# Patient Record
Sex: MALE | Race: ASIAN | NOT HISPANIC OR LATINO | Employment: FULL TIME | URBAN - METROPOLITAN AREA
[De-identification: names, ages, dates, MRNs, and addresses within clinical notes are randomized per-mention and may not be internally consistent; named-entity substitution may affect disease eponyms.]

---

## 2017-01-21 ENCOUNTER — TRANSCRIBE ORDERS (OUTPATIENT)
Dept: ADMINISTRATIVE | Facility: HOSPITAL | Age: 52
End: 2017-01-21

## 2017-01-21 ENCOUNTER — APPOINTMENT (OUTPATIENT)
Dept: LAB | Facility: HOSPITAL | Age: 52
End: 2017-01-21
Attending: INTERNAL MEDICINE
Payer: COMMERCIAL

## 2017-01-21 DIAGNOSIS — E13.8 DIABETES MELLITUS OF OTHER TYPE WITH COMPLICATION: ICD-10-CM

## 2017-01-21 DIAGNOSIS — E13.8 DIABETES MELLITUS OF OTHER TYPE WITH COMPLICATION: Primary | ICD-10-CM

## 2017-01-21 LAB
ALBUMIN SERPL BCP-MCNC: 3.7 G/DL (ref 3.5–5)
ALP SERPL-CCNC: 58 U/L (ref 46–116)
ALT SERPL W P-5'-P-CCNC: 44 U/L (ref 12–78)
ANION GAP SERPL CALCULATED.3IONS-SCNC: 11 MMOL/L (ref 4–13)
AST SERPL W P-5'-P-CCNC: 18 U/L (ref 5–45)
BILIRUB SERPL-MCNC: 0.57 MG/DL (ref 0.2–1)
BUN SERPL-MCNC: 11 MG/DL (ref 5–25)
CALCIUM SERPL-MCNC: 9.1 MG/DL (ref 8.3–10.1)
CHLORIDE SERPL-SCNC: 105 MMOL/L (ref 100–108)
CHOLEST SERPL-MCNC: 152 MG/DL (ref 50–200)
CO2 SERPL-SCNC: 26 MMOL/L (ref 21–32)
CREAT SERPL-MCNC: 1.16 MG/DL (ref 0.6–1.3)
EST. AVERAGE GLUCOSE BLD GHB EST-MCNC: 197 MG/DL
GFR SERPL CREATININE-BSD FRML MDRD: >60 ML/MIN/1.73SQ M
GLUCOSE SERPL-MCNC: 174 MG/DL (ref 65–140)
HBA1C MFR BLD: 8.5 % (ref 4.2–6.3)
HDLC SERPL-MCNC: 36 MG/DL (ref 40–60)
LDLC SERPL CALC-MCNC: 68 MG/DL (ref 0–100)
POTASSIUM SERPL-SCNC: 3.8 MMOL/L (ref 3.5–5.3)
PROT SERPL-MCNC: 7.2 G/DL (ref 6.4–8.2)
SODIUM SERPL-SCNC: 142 MMOL/L (ref 136–145)
TRIGL SERPL-MCNC: 239 MG/DL

## 2017-01-21 PROCEDURE — 36415 COLL VENOUS BLD VENIPUNCTURE: CPT

## 2017-01-21 PROCEDURE — 80053 COMPREHEN METABOLIC PANEL: CPT

## 2017-01-21 PROCEDURE — 80061 LIPID PANEL: CPT

## 2017-01-21 PROCEDURE — 83036 HEMOGLOBIN GLYCOSYLATED A1C: CPT

## 2017-01-31 ENCOUNTER — GENERIC CONVERSION - ENCOUNTER (OUTPATIENT)
Dept: OTHER | Facility: OTHER | Age: 52
End: 2017-01-31

## 2017-01-31 ENCOUNTER — ALLSCRIPTS OFFICE VISIT (OUTPATIENT)
Dept: OTHER | Facility: OTHER | Age: 52
End: 2017-01-31

## 2017-02-01 PROCEDURE — 88304 TISSUE EXAM BY PATHOLOGIST: CPT | Performed by: SURGERY

## 2017-02-02 ENCOUNTER — LAB REQUISITION (OUTPATIENT)
Dept: LAB | Facility: HOSPITAL | Age: 52
End: 2017-02-02
Payer: COMMERCIAL

## 2017-02-02 DIAGNOSIS — L91.8 OTHER HYPERTROPHIC DISORDERS OF THE SKIN: ICD-10-CM

## 2017-08-12 ENCOUNTER — TRANSCRIBE ORDERS (OUTPATIENT)
Dept: ADMINISTRATIVE | Facility: HOSPITAL | Age: 52
End: 2017-08-12

## 2017-08-12 ENCOUNTER — APPOINTMENT (OUTPATIENT)
Dept: LAB | Facility: HOSPITAL | Age: 52
End: 2017-08-12
Payer: COMMERCIAL

## 2017-08-12 DIAGNOSIS — Z00.8 HEALTH EXAMINATION IN POPULATION SURVEY: ICD-10-CM

## 2017-08-12 DIAGNOSIS — Z00.8 HEALTH EXAMINATION IN POPULATION SURVEY: Primary | ICD-10-CM

## 2017-08-12 LAB
CHOLEST SERPL-MCNC: 183 MG/DL (ref 50–200)
EST. AVERAGE GLUCOSE BLD GHB EST-MCNC: 166 MG/DL
HBA1C MFR BLD: 7.4 % (ref 4.2–6.3)
HDLC SERPL-MCNC: 33 MG/DL (ref 40–60)
LDLC SERPL CALC-MCNC: 103 MG/DL (ref 0–100)
TRIGL SERPL-MCNC: 234 MG/DL

## 2017-08-12 PROCEDURE — 83036 HEMOGLOBIN GLYCOSYLATED A1C: CPT

## 2017-08-12 PROCEDURE — 80061 LIPID PANEL: CPT

## 2017-08-12 PROCEDURE — 36415 COLL VENOUS BLD VENIPUNCTURE: CPT

## 2018-01-13 VITALS
TEMPERATURE: 96.8 F | WEIGHT: 186 LBS | SYSTOLIC BLOOD PRESSURE: 130 MMHG | BODY MASS INDEX: 25.19 KG/M2 | RESPIRATION RATE: 16 BRPM | HEIGHT: 72 IN | DIASTOLIC BLOOD PRESSURE: 76 MMHG | HEART RATE: 64 BPM

## 2018-06-13 RX ORDER — ATORVASTATIN CALCIUM 20 MG/1
40 TABLET, FILM COATED ORAL
COMMUNITY
End: 2021-05-30

## 2018-06-15 ENCOUNTER — ANESTHESIA EVENT (OUTPATIENT)
Dept: GASTROENTEROLOGY | Facility: HOSPITAL | Age: 53
End: 2018-06-15
Payer: COMMERCIAL

## 2018-06-18 ENCOUNTER — ANESTHESIA (OUTPATIENT)
Dept: GASTROENTEROLOGY | Facility: HOSPITAL | Age: 53
End: 2018-06-18
Payer: COMMERCIAL

## 2018-06-18 ENCOUNTER — HOSPITAL ENCOUNTER (OUTPATIENT)
Facility: HOSPITAL | Age: 53
Setting detail: OUTPATIENT SURGERY
Discharge: HOME | End: 2018-06-18
Attending: INTERNAL MEDICINE | Admitting: INTERNAL MEDICINE
Payer: COMMERCIAL

## 2018-06-18 VITALS
WEIGHT: 175 LBS | HEIGHT: 72 IN | BODY MASS INDEX: 23.7 KG/M2 | HEART RATE: 72 BPM | OXYGEN SATURATION: 100 % | RESPIRATION RATE: 20 BRPM | SYSTOLIC BLOOD PRESSURE: 128 MMHG | DIASTOLIC BLOOD PRESSURE: 81 MMHG | TEMPERATURE: 97 F

## 2018-06-18 DIAGNOSIS — Z12.11 ENCOUNTER FOR SCREENING FOR MALIGNANT NEOPLASM OF COLON: ICD-10-CM

## 2018-06-18 PROCEDURE — 88305 TISSUE EXAM BY PATHOLOGIST: CPT | Performed by: PATHOLOGY

## 2018-06-18 RX ORDER — PROPOFOL 10 MG/ML
INJECTION, EMULSION INTRAVENOUS AS NEEDED
Status: DISCONTINUED | OUTPATIENT
Start: 2018-06-18 | End: 2018-06-18 | Stop reason: SURG

## 2018-06-18 RX ORDER — SODIUM CHLORIDE 9 MG/ML
125 INJECTION, SOLUTION INTRAVENOUS CONTINUOUS
Status: DISCONTINUED | OUTPATIENT
Start: 2018-06-18 | End: 2018-06-18 | Stop reason: HOSPADM

## 2018-06-18 RX ORDER — FOLIC ACID/MULTIVIT,IRON,MINER .4-18-35
1 TABLET,CHEWABLE ORAL DAILY
COMMUNITY

## 2018-06-18 RX ORDER — INSULIN GLARGINE 100 [IU]/ML
40 INJECTION, SOLUTION SUBCUTANEOUS 2 TIMES DAILY
COMMUNITY
End: 2020-12-15 | Stop reason: SDUPTHER

## 2018-06-18 RX ADMIN — PROPOFOL 30 MG: 10 INJECTION, EMULSION INTRAVENOUS at 10:55

## 2018-06-18 RX ADMIN — PROPOFOL 30 MG: 10 INJECTION, EMULSION INTRAVENOUS at 11:02

## 2018-06-18 RX ADMIN — SODIUM CHLORIDE: 0.9 INJECTION, SOLUTION INTRAVENOUS at 10:38

## 2018-06-18 RX ADMIN — PROPOFOL 40 MG: 10 INJECTION, EMULSION INTRAVENOUS at 10:45

## 2018-06-18 RX ADMIN — PROPOFOL 80 MG: 10 INJECTION, EMULSION INTRAVENOUS at 10:40

## 2018-06-18 RX ADMIN — PROPOFOL 70 MG: 10 INJECTION, EMULSION INTRAVENOUS at 10:50

## 2018-06-18 RX ADMIN — SODIUM CHLORIDE 125 ML/HR: 0.9 INJECTION, SOLUTION INTRAVENOUS at 09:40

## 2018-06-18 NOTE — OP NOTE
OPERATIVE REPORT  PATIENT NAME: Elizabeth Montoya    :  1965  MRN: [id number]  Pt Location: [facility] 01    SURGERY DATE: 2018    Surgeon(s) and Role:     * Nicolas Meeks MD - Primary    Preop Diagnosis:  Encounter for screening for malignant neoplasm of colon [Z12 11]    Post-Op Diagnosis Codes:     * Encounter for screening for malignant neoplasm of colon [Z12 11]    Procedure(s) (LRB):  COLONOSCOPY with polypectomy (N/A)    Specimen(s):  ID Type Source Tests Collected by Time Destination   1 : Rectal polyp Tissue Colon TISSUE EXAM Nicolas Meeks MD 2018 110        Estimated Blood Loss:   Minimal    Drains:       Anesthesia Type:   IV Sedation with Anesthesia    Operative Indications:  Encounter for screening for malignant neoplasm of colon [Z12 11]      Operative Findings:      Instrument: Olympus colonoscope  Medications: MAC per Anesthesia Service    COLONOSCOPY Procedure: Pre-procedure eval including cardiopulmonary exam was benign  The pt was felt to be stable for sedation and endoscopy  Consent was obtained  The pt was monitored with automatic BP, pOx and close clinical observation  COLONOSCOPY Findings: Inspection of anus and circumferential digital rectal examination were normal   The colonoscope was inserted and advanced to cecum as confirmed by regional landmarks: appendiceal orifice and ileocecal valve  The terminal ileum was intubated and appeared unremarkable  During withdrawal of the colonoscope, the colonic mucosa was carefully examined including the proximal aspects of the IC valve, the flexures and folds of the colon  Withdrawal time was 12 min  The colon prep was adequate with a score of 8/9 on the Ainsworth scale  There was severe diffuse diverticulosis throughout the colon  One diminutive rectal polyp was removed with cold Bx polypectomy  A retroflexed view in the rectum was unremarkable          The patient tolerated the procedure well without undue dicomfort, desaturation or hypotension  The patient recovered in the GI lab and was discharged to the company of family  Post-Endoscopic Dx:     1  Severe diffuse colon diverticulosis  2  Diminutive polyp removed from the  rectum      Recommendations:   1  Take Metamucil 2 capsules twice daily  2  Await pathology report  3    Recall colonoscopy in 5 to 10 depending on pathology results    Complications:       Procedure and Technique:         Patient Disposition:      SIGNATURE: Nicolas Meeks MD  DATE: June 18, 2018  TIME: 11:13 AM

## 2018-06-18 NOTE — DISCHARGE INSTRUCTIONS
Jovanna Espinoza  [id number]  [id number]  1965  6/18/2018    [facility]  POST-ENDOSCOPY INSTRUCTIONS    1)  Rest Today    2)  No alcoholic beverage or driving for 12 hours  3)  Return to your usual diet unless specifically insructed prior to leaving the facility  4) Call our office at [phone] if you have any problems or questions  5) Your examination showed:   A  Diffuse severe colon diverticulosis   B  One diminutive polyp was removed from rectum with cold biopsy polypectomy         6)  Medication: ___ Metamucil 2 capsules twice daily _________________________    _____________________________    7) Follow up at our GI office in:________________    8)  EGD due:  __________________    Colonoscopy due:  __ 5 to 10 years depending pathology results _____________              JASON Low M D Pasty Kub, M D Donnal Folds, MD    ____________________________________________________________                  Colorectal Polyps   WHAT YOU NEED TO KNOW:   Colorectal polyps are small growths of tissue in the lining of the colon and rectum  Most polyps are hyperplastic polyps and are usually benign (noncancerous)  Certain types of polyps, called adenomatous polyps, may turn into cancer  DISCHARGE INSTRUCTIONS:   Follow up with your healthcare provider or gastroenterologist as directed: You may need to return for more tests, such as another colonoscopy  Write down your questions so you remember to ask them during your visits  Reduce your risk for colorectal polyps:   · Eat a variety of healthy foods:  Healthy foods include fruit, vegetables, whole-grain breads, low-fat dairy products, beans, lean meat, and fish  Ask if you need to be on a special diet  · Maintain a healthy weight:  Ask your healthcare provider if you need to lose weight and how much you need to lose   Ask for help with a weight loss program     · Exercise: Begin to exercise slowly and do more as you get stronger  Talk with your healthcare provider before you start an exercise program      · Limit alcohol:  Your risk for polyps increases the more you drink  · Do not smoke: If you smoke, it is never too late to quit  Ask for information about how to stop  For support and more information:   · Nati Parra ([facility])  [address] , [place] [address]  Phone: [phone]  Web Address: www digestive  niddk nih gov  Contact your healthcare provider or gastroenterologist if:   · You have a fever  · You have chills, a cough, or feel weak and achy  · You have abdominal pain that does not go away or gets worse after you take medicine  · Your abdomen is swollen  · You are losing weight without trying  · You have questions or concerns about your condition or care  Seek care immediately or call 911 if:   · You have sudden shortness of breath  · You have a fast heart rate, fast breathing, or are too dizzy to stand up  · You have severe abdominal pain  · You see blood in your bowel movement  © 2017 [facility] [facility] Information is for End User's use only and may not be sold, redistributed or otherwise used for commercial purposes  All illustrations and images included in CareNotes® are the copyrighted property of A D A M , Inc  or Federico Prasad  The above information is an  only  It is not intended as medical advice for individual conditions or treatments  Talk to your doctor, nurse or pharmacist before following any medical regimen to see if it is safe and effective for you  Diverticulosis   WHAT YOU NEED TO KNOW:   Diverticulosis is a condition that causes small pockets called diverticula to form in your intestine  These pockets make it difficult for bowel movements to pass through your digestive system         DISCHARGE INSTRUCTIONS:   Seek care immediately if:   · You have severe pain on the left side of your lower abdomen  · Your bowel movements are bright or dark red  Contact your healthcare provider if:   · You have a fever and chills  · You feel dizzy or lightheaded  · You have nausea, or you are vomiting  · You have a change in your bowel movements  · You have questions or concerns about your condition or care  Medicines:   · Medicines  to soften your bowel movements may be given  You may also need medicines to treat symptoms such as bloating and pain  · Take your medicine as directed  Contact your healthcare provider if you think your medicine is not helping or if you have side effects  Tell him or her if you are allergic to any medicine  Keep a list of the medicines, vitamins, and herbs you take  Include the amounts, and when and why you take them  Bring the list or the pill bottles to follow-up visits  Carry your medicine list with you in case of an emergency  Self-care: The goal of treatment is to manage any symptoms you have and prevent other problems such as diverticulitis  Diverticulitis is swelling or infection of the diverticula  Your healthcare provider may recommend any of the following:  · Eat a variety of high-fiber foods  High-fiber foods help you have regular bowel movements  High-fiber foods include cooked beans, fruits, vegetables, and some cereals  Most adults need 25 to 35 grams of fiber each day  Your healthcare provider may recommend that you have more  Ask your healthcare provider how much fiber you need  Increase fiber slowly  You may have abdominal discomfort, bloating, and gas if you add fiber to your diet too quickly  You may need to take a fiber supplement if you are not getting enough fiber from food  · Drink liquids as directed  You may need to drink 2 to 3 liters (8 to 12 cups) of liquids every day   Ask your healthcare provider how much liquid to drink each day and which liquids are best for you  · Apply heat  on your abdomen for 20 to 30 minutes every 2 hours for as many days as directed  Heat helps decrease pain and muscle spasms  Help prevent diverticulitis or other symptoms: The following may help decrease your risk for diverticulitis or symptoms, such as bleeding  Talk to your provider about these or other things you can do to prevent problems that may occur with diverticulosis  · Exercise regularly  Ask your healthcare provider about the best exercise plan for you  Exercise can help you have regular bowel movements  Get 30 minutes of exercise on most days of the week  · Maintain a healthy weight  Ask your healthcare provider how much you should weigh  Ask him or her to help you create a weight loss plan if you are overweight  · Do not smoke  Nicotine and other chemicals in cigarettes increase your risk for diverticulitis  Ask your healthcare provider for information if you currently smoke and need help to quit  E-cigarettes or smokeless tobacco still contain nicotine  Talk to your healthcare provider before you use these products  · Ask your healthcare provider if it is safe to take NSAIDs  NSAIDs may increase your risk of diverticulitis  Follow up with your healthcare provider as directed:  Write down your questions so you remember to ask them during your visits  © 2017 [address] [facility] Information is for End User's use only and may not be sold, redistributed or otherwise used for commercial purposes  All illustrations and images included in CareNotes® are the copyrighted property of [employer] , [employer]  or Federico Prasad  The above information is an  only  It is not intended as medical advice for individual conditions or treatments  Talk to your doctor, nurse or pharmacist before following any medical regimen to see if it is safe and effective for you        Diverticulosis Diet   WHAT YOU NEED TO KNOW:   What is a diverticulosis diet?  A diverticulosis diet includes high-fiber foods  High-fiber foods help you have regular bowel movements  Extra fiber may decrease your risk of forming new diverticula (small pockets) in your intestine  A high-fiber diet may also help prevent diverticulitis  Diverticulitis is a painful condition that occurs when diverticula become inflamed or infected  You do not need to avoid nuts, seeds, corn, or popcorn while you are on a diverticulosis diet  How much fiber do I need? You may need 25 to 35 grams of fiber each day  Ask your dietitian or healthcare provider how much fiber you should have  Increase your intake of fiber slowly  When you eat more fiber, you may have gas and feel bloated  You may need to take a fiber supplement if you do not get enough fiber from food  Drink plenty of liquids as you increase the fiber in your diet  Your dietitian or healthcare provider may recommend 8 eight-ounce cups or more each day  Ask which liquids are best for you  Which foods are high in fiber? · Foods with at least 4 grams of fiber per serving:      ¨ ? to ½ cup of high-fiber cereal (check the nutrition label on the box)    ¨ ½ cup of blackberries or raspberries    ¨ 4 dried prunes    ¨ 1 cooked artichoke    ¨ ½ cup of cooked legumes, such as lentils, or red, kidney, and snyder beans    · Foods with 1 to 3 grams of fiber per serving:      ¨ 1 slice of whole-wheat, pumpernickel, or rye bread    ¨ 4 whole-wheat crackers    ¨ ½ cup of cereal with 1 to 3 grams of fiber per serving (check the nutrition label on the box)    ¨ 1 piece of fruit, such as an apple, banana, pear, kiwi, or orange    ¨ 3 dates    ¨ ½ cup of canned apricots, fruit cocktail, peaches, or pears    ¨ ½ cup of raw or cooked vegetables, such as carrots, cauliflower, cabbage, spinach, squash, or corn  When should I contact my healthcare provider? · You have questions about a high-fiber diet  · You have a change in your bowel movements      · You have an upset stomach  · You have a fever  · You have pain in your lower abdomen on the left side  · You have questions about your condition or care  CARE AGREEMENT:   You have the right to help plan your care  Learn about your health condition and how it may be treated  Discuss treatment options with your caregivers to decide what care you want to receive  You always have the right to refuse treatment  The above information is an  only  It is not intended as medical advice for individual conditions or treatments  Talk to your doctor, nurse or pharmacist before following any medical regimen to see if it is safe and effective for you  © 2017 [address] [employer]  Information is for End User's use only and may not be sold, redistributed or otherwise used for commercial purposes  All illustrations and images included in CareNotes® are the copyrighted property of [employer] , [employer]  or Federico Prasad  Colonoscopy   WHAT YOU NEED TO KNOW:   A colonoscopy is a procedure to examine the inside of your colon (intestine) with a scope  Polyps or tissue growths may have been removed during your colonoscopy  It is normal to feel bloated and to have some abdominal discomfort  You should be passing gas  If you have hemorrhoids or you had polyps removed, you may have a small amount of bleeding  DISCHARGE INSTRUCTIONS:   Seek care immediately if:   · You have a large amount of bright red blood in your bowel movements  · Your abdomen is hard and firm and you have severe pain  · You have sudden trouble breathing  Contact your healthcare provider if:   · You develop a rash or hives  · You have a fever within 24 hours of your procedure       · You have not had a bowel movement for 3 days after your procedure  · You have questions or concerns about your condition or care  Activity:   · Do not lift, strain, or run  for 3 days after your procedure  · Rest after your procedure  You have been given medicine to relax you  Do not  drive or make important decisions until the day after your procedure  Return to your normal activity as directed  · Relieve gas and discomfort from bloating  by lying on your right side with a heating pad on your abdomen  You may need to take short walks to help the gas move out  Eat small meals until bloating is relieved  If you had polyps removed: For 7 days after your procedure:  · Do not  take aspirin  · Do not  go on long car rides  Follow up with your healthcare provider as directed:  Write down your questions so you remember to ask them during your visits  © 2017 [address] [employer] is for End User's use only and may not be sold, redistributed or otherwise used for commercial purposes  All illustrations and images included in CareNotes® are the copyrighted property of A D A [employer]  or Federico Prasad  The above information is an  only  It is not intended as medical advice for individual conditions or treatments  Talk to your doctor, nurse or pharmacist before following any medical regimen to see if it is safe and effective for you

## 2018-06-18 NOTE — ANESTHESIA PREPROCEDURE EVALUATION
Review of Systems/Medical History  Patient summary reviewed  Chart reviewed  No history of anesthetic complications     Cardiovascular  Hyperlipidemia,    Pulmonary  Negative pulmonary ROS        GI/Hepatic    Bowel prep            Endo/Other  Diabetes well controlled type 2 Insulin,      GYN  Negative gynecology ROS          Hematology  Negative hematology ROS      Musculoskeletal  Negative musculoskeletal ROS        Neurology  Negative neurology ROS      Psychology   Negative psychology ROS              Physical Exam    Airway    Mallampati score: II  TM Distance: >3 FB  Neck ROM: full     Dental   No notable dental hx     Cardiovascular  Rhythm: regular, Rate: normal, Cardiovascular exam normal    Pulmonary  Pulmonary exam normal Breath sounds clear to auscultation,     Other Findings        Anesthesia Plan  ASA Score- 2     Anesthesia Type- Kerens block with ASA Monitors  Additional Monitors:   Airway Plan:         Plan Factors- Patient instructed to abstain from smoking on day of procedure  Patient did not smoke on day of surgery  Induction- intravenous  Postoperative Plan-     Informed Consent- Anesthetic plan and risks discussed with spouse and patient  I personally reviewed this patient with the CRNA  Discussed and agreed on the Anesthesia Plan with the CRNA  Bernice Hernadez

## 2018-06-18 NOTE — PROGRESS NOTES
D/C instructions given to pt and wife who verbalize understanding  OOB to ambulate, gait steady denies dizziness

## 2018-09-07 ENCOUNTER — APPOINTMENT (OUTPATIENT)
Dept: LAB | Facility: HOSPITAL | Age: 53
End: 2018-09-07
Payer: COMMERCIAL

## 2018-09-07 ENCOUNTER — TRANSCRIBE ORDERS (OUTPATIENT)
Dept: ADMINISTRATIVE | Facility: HOSPITAL | Age: 53
End: 2018-09-07

## 2018-09-07 DIAGNOSIS — Z00.8 HEALTH EXAMINATION IN POPULATION SURVEY: ICD-10-CM

## 2018-09-07 DIAGNOSIS — Z00.8 HEALTH EXAMINATION IN POPULATION SURVEY: Primary | ICD-10-CM

## 2018-09-07 LAB
CHOLEST SERPL-MCNC: 141 MG/DL (ref 50–200)
EST. AVERAGE GLUCOSE BLD GHB EST-MCNC: 151 MG/DL
HBA1C MFR BLD: 6.9 % (ref 4.2–6.3)
HDLC SERPL-MCNC: 29 MG/DL (ref 40–60)
LDLC SERPL CALC-MCNC: 73 MG/DL (ref 0–100)
NONHDLC SERPL-MCNC: 112 MG/DL
TRIGL SERPL-MCNC: 196 MG/DL

## 2018-09-07 PROCEDURE — 36415 COLL VENOUS BLD VENIPUNCTURE: CPT

## 2018-09-07 PROCEDURE — 83036 HEMOGLOBIN GLYCOSYLATED A1C: CPT

## 2018-09-07 PROCEDURE — 80061 LIPID PANEL: CPT

## 2019-09-18 ENCOUNTER — TRANSCRIBE ORDERS (OUTPATIENT)
Dept: LAB | Facility: CLINIC | Age: 54
End: 2019-09-18

## 2019-09-18 ENCOUNTER — APPOINTMENT (OUTPATIENT)
Dept: LAB | Facility: CLINIC | Age: 54
End: 2019-09-18
Payer: COMMERCIAL

## 2019-09-18 DIAGNOSIS — E78.5 HYPERLIPIDEMIA, UNSPECIFIED HYPERLIPIDEMIA TYPE: ICD-10-CM

## 2019-09-18 DIAGNOSIS — E11.65 TYPE II DIABETES MELLITUS WITH HYPEROSMOLARITY, UNCONTROLLED (HCC): ICD-10-CM

## 2019-09-18 DIAGNOSIS — E78.5 HYPERLIPIDEMIA, UNSPECIFIED HYPERLIPIDEMIA TYPE: Primary | ICD-10-CM

## 2019-09-18 DIAGNOSIS — E11.00 TYPE II DIABETES MELLITUS WITH HYPEROSMOLARITY, UNCONTROLLED (HCC): ICD-10-CM

## 2019-09-18 LAB
ALBUMIN SERPL BCP-MCNC: 3.8 G/DL (ref 3.5–5)
ALP SERPL-CCNC: 67 U/L (ref 46–116)
ALT SERPL W P-5'-P-CCNC: 51 U/L (ref 12–78)
ANION GAP SERPL CALCULATED.3IONS-SCNC: 7 MMOL/L (ref 4–13)
AST SERPL W P-5'-P-CCNC: 21 U/L (ref 5–45)
BILIRUB SERPL-MCNC: 0.5 MG/DL (ref 0.2–1)
BUN SERPL-MCNC: 13 MG/DL (ref 5–25)
CALCIUM SERPL-MCNC: 9.3 MG/DL (ref 8.3–10.1)
CHLORIDE SERPL-SCNC: 104 MMOL/L (ref 100–108)
CHOLEST SERPL-MCNC: 215 MG/DL (ref 50–200)
CO2 SERPL-SCNC: 30 MMOL/L (ref 21–32)
CREAT SERPL-MCNC: 1.2 MG/DL (ref 0.6–1.3)
CREAT UR-MCNC: 211 MG/DL
EST. AVERAGE GLUCOSE BLD GHB EST-MCNC: 169 MG/DL
GFR SERPL CREATININE-BSD FRML MDRD: 68 ML/MIN/1.73SQ M
GLUCOSE P FAST SERPL-MCNC: 157 MG/DL (ref 65–99)
HBA1C MFR BLD: 7.5 % (ref 4.2–6.3)
HDLC SERPL-MCNC: 31 MG/DL (ref 40–60)
LDLC SERPL CALC-MCNC: 109 MG/DL (ref 0–100)
MICROALBUMIN UR-MCNC: 47.2 MG/L (ref 0–20)
MICROALBUMIN/CREAT 24H UR: 22 MG/G CREATININE (ref 0–30)
NONHDLC SERPL-MCNC: 184 MG/DL
POTASSIUM SERPL-SCNC: 4 MMOL/L (ref 3.5–5.3)
PROT SERPL-MCNC: 7.5 G/DL (ref 6.4–8.2)
SODIUM SERPL-SCNC: 141 MMOL/L (ref 136–145)
TRIGL SERPL-MCNC: 375 MG/DL

## 2019-09-18 PROCEDURE — 82043 UR ALBUMIN QUANTITATIVE: CPT | Performed by: INTERNAL MEDICINE

## 2019-09-18 PROCEDURE — 83036 HEMOGLOBIN GLYCOSYLATED A1C: CPT | Performed by: INTERNAL MEDICINE

## 2019-09-18 PROCEDURE — 82570 ASSAY OF URINE CREATININE: CPT | Performed by: INTERNAL MEDICINE

## 2019-09-18 PROCEDURE — 36415 COLL VENOUS BLD VENIPUNCTURE: CPT | Performed by: INTERNAL MEDICINE

## 2019-09-18 PROCEDURE — 80053 COMPREHEN METABOLIC PANEL: CPT

## 2019-09-18 PROCEDURE — 80061 LIPID PANEL: CPT

## 2020-09-08 ENCOUNTER — TELEPHONE (OUTPATIENT)
Dept: INTERNAL MEDICINE CLINIC | Facility: CLINIC | Age: 55
End: 2020-09-08

## 2020-09-08 DIAGNOSIS — E11.65 UNCONTROLLED TYPE 2 DIABETES MELLITUS WITH HYPERGLYCEMIA (HCC): Primary | ICD-10-CM

## 2020-09-08 NOTE — TELEPHONE ENCOUNTER
Pt will need a script for bloodwork to go before his next appt with you end of sept  He wants a1c, lipid panel, tsh, psa, bmp and urine for protein/creatine ratio   He would like us to mail to him

## 2020-09-18 ENCOUNTER — APPOINTMENT (OUTPATIENT)
Dept: LAB | Facility: CLINIC | Age: 55
End: 2020-09-18
Payer: COMMERCIAL

## 2020-09-18 DIAGNOSIS — E11.65 UNCONTROLLED TYPE 2 DIABETES MELLITUS WITH HYPERGLYCEMIA (HCC): ICD-10-CM

## 2020-09-18 LAB
ALBUMIN SERPL BCP-MCNC: 3.8 G/DL (ref 3.5–5)
ALP SERPL-CCNC: 67 U/L (ref 46–116)
ALT SERPL W P-5'-P-CCNC: 30 U/L (ref 12–78)
ANION GAP SERPL CALCULATED.3IONS-SCNC: 11 MMOL/L (ref 4–13)
AST SERPL W P-5'-P-CCNC: 20 U/L (ref 5–45)
BASOPHILS # BLD AUTO: 0.03 THOUSANDS/ΜL (ref 0–0.1)
BASOPHILS NFR BLD AUTO: 0 % (ref 0–1)
BILIRUB SERPL-MCNC: 0.66 MG/DL (ref 0.2–1)
BUN SERPL-MCNC: 16 MG/DL (ref 5–25)
CALCIUM SERPL-MCNC: 9.5 MG/DL (ref 8.3–10.1)
CHLORIDE SERPL-SCNC: 107 MMOL/L (ref 100–108)
CHOLEST SERPL-MCNC: 126 MG/DL (ref 50–200)
CO2 SERPL-SCNC: 27 MMOL/L (ref 21–32)
CREAT SERPL-MCNC: 1.25 MG/DL (ref 0.6–1.3)
CREAT UR-MCNC: 168 MG/DL
EOSINOPHIL # BLD AUTO: 0.42 THOUSAND/ΜL (ref 0–0.61)
EOSINOPHIL NFR BLD AUTO: 5 % (ref 0–6)
ERYTHROCYTE [DISTWIDTH] IN BLOOD BY AUTOMATED COUNT: 12.3 % (ref 11.6–15.1)
EST. AVERAGE GLUCOSE BLD GHB EST-MCNC: 157 MG/DL
GFR SERPL CREATININE-BSD FRML MDRD: 64 ML/MIN/1.73SQ M
GLUCOSE P FAST SERPL-MCNC: 113 MG/DL (ref 65–99)
HBA1C MFR BLD: 7.1 %
HCT VFR BLD AUTO: 51.8 % (ref 36.5–49.3)
HDLC SERPL-MCNC: 33 MG/DL
HGB BLD-MCNC: 16.3 G/DL (ref 12–17)
IMM GRANULOCYTES # BLD AUTO: 0.03 THOUSAND/UL (ref 0–0.2)
IMM GRANULOCYTES NFR BLD AUTO: 0 % (ref 0–2)
LDLC SERPL CALC-MCNC: 72 MG/DL (ref 0–100)
LYMPHOCYTES # BLD AUTO: 2.57 THOUSANDS/ΜL (ref 0.6–4.47)
LYMPHOCYTES NFR BLD AUTO: 33 % (ref 14–44)
MCH RBC QN AUTO: 28.1 PG (ref 26.8–34.3)
MCHC RBC AUTO-ENTMCNC: 31.5 G/DL (ref 31.4–37.4)
MCV RBC AUTO: 89 FL (ref 82–98)
MONOCYTES # BLD AUTO: 0.49 THOUSAND/ΜL (ref 0.17–1.22)
MONOCYTES NFR BLD AUTO: 6 % (ref 4–12)
NEUTROPHILS # BLD AUTO: 4.19 THOUSANDS/ΜL (ref 1.85–7.62)
NEUTS SEG NFR BLD AUTO: 56 % (ref 43–75)
NONHDLC SERPL-MCNC: 93 MG/DL
NRBC BLD AUTO-RTO: 0 /100 WBCS
PLATELET # BLD AUTO: 265 THOUSANDS/UL (ref 149–390)
PMV BLD AUTO: 9.6 FL (ref 8.9–12.7)
POTASSIUM SERPL-SCNC: 4.9 MMOL/L (ref 3.5–5.3)
PROT SERPL-MCNC: 7.2 G/DL (ref 6.4–8.2)
PROT UR-MCNC: 21 MG/DL
PROT/CREAT UR: 0.13 MG/G{CREAT} (ref 0–0.1)
RBC # BLD AUTO: 5.81 MILLION/UL (ref 3.88–5.62)
SODIUM SERPL-SCNC: 145 MMOL/L (ref 136–145)
TRIGL SERPL-MCNC: 106 MG/DL
WBC # BLD AUTO: 7.73 THOUSAND/UL (ref 4.31–10.16)

## 2020-09-18 PROCEDURE — 84156 ASSAY OF PROTEIN URINE: CPT

## 2020-09-18 PROCEDURE — 85025 COMPLETE CBC W/AUTO DIFF WBC: CPT

## 2020-09-18 PROCEDURE — 82570 ASSAY OF URINE CREATININE: CPT

## 2020-09-18 PROCEDURE — 36415 COLL VENOUS BLD VENIPUNCTURE: CPT

## 2020-09-18 PROCEDURE — 80061 LIPID PANEL: CPT

## 2020-09-18 PROCEDURE — 83036 HEMOGLOBIN GLYCOSYLATED A1C: CPT

## 2020-09-18 PROCEDURE — 80053 COMPREHEN METABOLIC PANEL: CPT

## 2020-09-28 ENCOUNTER — OFFICE VISIT (OUTPATIENT)
Dept: INTERNAL MEDICINE CLINIC | Facility: CLINIC | Age: 55
End: 2020-09-28
Payer: COMMERCIAL

## 2020-09-28 VITALS
WEIGHT: 191 LBS | TEMPERATURE: 97.5 F | BODY MASS INDEX: 25.87 KG/M2 | HEART RATE: 99 BPM | HEIGHT: 72 IN | DIASTOLIC BLOOD PRESSURE: 82 MMHG | SYSTOLIC BLOOD PRESSURE: 134 MMHG

## 2020-09-28 DIAGNOSIS — N42.89 PROSTATE ATROPHY: ICD-10-CM

## 2020-09-28 DIAGNOSIS — Z79.4 TYPE 2 DIABETES MELLITUS WITHOUT COMPLICATION, WITH LONG-TERM CURRENT USE OF INSULIN (HCC): Primary | ICD-10-CM

## 2020-09-28 DIAGNOSIS — I10 ESSENTIAL HYPERTENSION: ICD-10-CM

## 2020-09-28 DIAGNOSIS — E11.9 TYPE 2 DIABETES MELLITUS WITHOUT COMPLICATION, WITH LONG-TERM CURRENT USE OF INSULIN (HCC): Primary | ICD-10-CM

## 2020-09-28 PROCEDURE — 99214 OFFICE O/P EST MOD 30 MIN: CPT | Performed by: INTERNAL MEDICINE

## 2020-09-28 RX ORDER — LOSARTAN POTASSIUM 25 MG/1
25 TABLET ORAL DAILY
Qty: 90 TABLET | Refills: 4 | Status: SHIPPED | OUTPATIENT
Start: 2020-09-28 | End: 2021-09-20 | Stop reason: SDUPTHER

## 2020-09-28 RX ORDER — INSULIN ASPART 100 [IU]/ML
20 INJECTION, SOLUTION INTRAVENOUS; SUBCUTANEOUS
Qty: 18 PEN | Refills: 4 | Status: SHIPPED | OUTPATIENT
Start: 2020-09-28 | End: 2020-12-15 | Stop reason: SDUPTHER

## 2020-09-28 NOTE — PROGRESS NOTES
Assessment/Plan:    No problem-specific Assessment & Plan notes found for this encounter  Diagnoses and all orders for this visit:    Prostate atrophy  -     PSA, Total Screen; Future    Type 2 diabetes mellitus without complication, with long-term current use of insulin (HCC)  -     insulin lispro (HumaLOG) 100 units/mL subcutaneous injection 20 Units  -     Hemoglobin A1C; Future  -     Lipid panel; Future  -     Comprehensive metabolic panel; Future    Essential hypertension  -     losartan (COZAAR) 25 mg tablet; Take 1 tablet (25 mg total) by mouth daily          Subjective:      Patient ID: Niall Pagan is a 62 y o  male  Follow up  Diabetes type  2, Hypertension, Hyperlipidemia      The following portions of the patient's history were reviewed and updated as appropriate: allergies, current medications, past family history, past medical history, past social history, past surgical history, and problem list     Review of Systems   Constitutional: Negative  HENT: Negative for dental problem, drooling, ear discharge and ear pain  Eyes: Negative for discharge, redness and itching  Respiratory: Negative for apnea, cough and wheezing  Cardiovascular: Negative for chest pain and palpitations  Gastrointestinal: Negative for abdominal pain, blood in stool, diarrhea and vomiting  Endocrine: Negative for polydipsia, polyphagia and polyuria  Genitourinary: Negative for decreased urine volume, dysuria and frequency  Musculoskeletal: Negative for arthralgias, myalgias and neck stiffness  Skin: Negative for pallor and wound  Allergic/Immunologic: Negative for environmental allergies and food allergies  Neurological: Negative for facial asymmetry, light-headedness, numbness and headaches  Hematological: Negative for adenopathy  Does not bruise/bleed easily  Psychiatric/Behavioral: Negative for agitation, behavioral problems and confusion           Objective:      /82 (BP Location: Left arm, Patient Position: Sitting, Cuff Size: Adult)   Pulse 99   Temp 97 5 °F (36 4 °C) (Temporal)   Ht 6' (1 829 m)   Wt 86 6 kg (191 lb)   BMI 25 90 kg/m²          Physical Exam  Constitutional:       Appearance: Normal appearance  HENT:      Head: Normocephalic  Nose: Nose normal       Mouth/Throat:      Mouth: Mucous membranes are moist    Eyes:      Pupils: Pupils are equal, round, and reactive to light  Neck:      Musculoskeletal: Neck supple  Cardiovascular:      Rate and Rhythm: Regular rhythm  Heart sounds: Normal heart sounds  Pulmonary:      Breath sounds: Normal breath sounds  Abdominal:      Palpations: Abdomen is soft  Musculoskeletal:         General: No swelling  Skin:     General: Skin is warm  Neurological:      General: No focal deficit present  Mental Status: He is alert and oriented to person, place, and time     Psychiatric:         Mood and Affect: Mood normal

## 2020-11-25 ENCOUNTER — LAB (OUTPATIENT)
Dept: LAB | Facility: CLINIC | Age: 55
End: 2020-11-25
Payer: COMMERCIAL

## 2020-11-25 DIAGNOSIS — N42.89 PROSTATE ATROPHY: ICD-10-CM

## 2020-11-25 LAB — PSA SERPL-MCNC: 1.2 NG/ML (ref 0–4)

## 2020-11-25 PROCEDURE — G0103 PSA SCREENING: HCPCS

## 2020-11-25 PROCEDURE — 36415 COLL VENOUS BLD VENIPUNCTURE: CPT

## 2020-12-15 DIAGNOSIS — E11.9 TYPE 2 DIABETES MELLITUS WITHOUT COMPLICATION, WITH LONG-TERM CURRENT USE OF INSULIN (HCC): Primary | ICD-10-CM

## 2020-12-15 DIAGNOSIS — Z79.4 TYPE 2 DIABETES MELLITUS WITHOUT COMPLICATION, WITH LONG-TERM CURRENT USE OF INSULIN (HCC): Primary | ICD-10-CM

## 2020-12-15 RX ORDER — INSULIN GLARGINE 100 [IU]/ML
40 INJECTION, SOLUTION SUBCUTANEOUS 2 TIMES DAILY
Qty: 2400 UNITS | Refills: 0 | Status: SHIPPED | OUTPATIENT
Start: 2020-12-15 | End: 2021-03-02 | Stop reason: SDUPTHER

## 2020-12-15 RX ORDER — INSULIN ASPART 100 [IU]/ML
20 INJECTION, SOLUTION INTRAVENOUS; SUBCUTANEOUS
Qty: 18 PEN | Refills: 4 | Status: SHIPPED | OUTPATIENT
Start: 2020-12-15 | End: 2021-09-20 | Stop reason: SDUPTHER

## 2020-12-18 ENCOUNTER — IMMUNIZATIONS (OUTPATIENT)
Dept: FAMILY MEDICINE CLINIC | Facility: HOSPITAL | Age: 55
End: 2020-12-18
Payer: COMMERCIAL

## 2020-12-18 DIAGNOSIS — Z23 ENCOUNTER FOR IMMUNIZATION: ICD-10-CM

## 2020-12-18 PROCEDURE — 91300 SARS-COV-2 / COVID-19 MRNA VACCINE (PFIZER-BIONTECH) 30 MCG: CPT

## 2020-12-18 PROCEDURE — 0001A SARS-COV-2 / COVID-19 MRNA VACCINE (PFIZER-BIONTECH) 30 MCG: CPT

## 2021-01-08 ENCOUNTER — IMMUNIZATIONS (OUTPATIENT)
Dept: FAMILY MEDICINE CLINIC | Facility: HOSPITAL | Age: 56
End: 2021-01-08

## 2021-01-08 DIAGNOSIS — Z23 ENCOUNTER FOR IMMUNIZATION: ICD-10-CM

## 2021-01-08 PROCEDURE — 91300 SARS-COV-2 / COVID-19 MRNA VACCINE (PFIZER-BIONTECH) 30 MCG: CPT

## 2021-01-08 PROCEDURE — 0002A SARS-COV-2 / COVID-19 MRNA VACCINE (PFIZER-BIONTECH) 30 MCG: CPT

## 2021-03-02 DIAGNOSIS — E11.9 TYPE 2 DIABETES MELLITUS WITHOUT COMPLICATION, WITH LONG-TERM CURRENT USE OF INSULIN (HCC): ICD-10-CM

## 2021-03-02 DIAGNOSIS — Z79.4 TYPE 2 DIABETES MELLITUS WITHOUT COMPLICATION, WITH LONG-TERM CURRENT USE OF INSULIN (HCC): ICD-10-CM

## 2021-03-02 RX ORDER — INSULIN GLARGINE 100 [IU]/ML
20 INJECTION, SOLUTION SUBCUTANEOUS 2 TIMES DAILY
Qty: 1200 UNITS | Refills: 0 | Status: SHIPPED | OUTPATIENT
Start: 2021-03-02 | End: 2021-06-01 | Stop reason: SDUPTHER

## 2021-05-28 DIAGNOSIS — E11.9 TYPE 2 DIABETES MELLITUS WITHOUT COMPLICATION, WITH LONG-TERM CURRENT USE OF INSULIN (HCC): Primary | ICD-10-CM

## 2021-05-28 DIAGNOSIS — Z79.4 TYPE 2 DIABETES MELLITUS WITHOUT COMPLICATION, WITH LONG-TERM CURRENT USE OF INSULIN (HCC): Primary | ICD-10-CM

## 2021-05-28 DIAGNOSIS — E78.5 HYPERLIPIDEMIA, UNSPECIFIED HYPERLIPIDEMIA TYPE: ICD-10-CM

## 2021-05-30 RX ORDER — INSULIN GLARGINE 100 [IU]/ML
INJECTION, SOLUTION SUBCUTANEOUS
Qty: 30 ML | Refills: 0 | Status: SHIPPED | OUTPATIENT
Start: 2021-05-30 | End: 2021-06-01

## 2021-05-30 RX ORDER — ATORVASTATIN CALCIUM 20 MG/1
TABLET, FILM COATED ORAL
Qty: 90 TABLET | Refills: 0 | Status: SHIPPED | OUTPATIENT
Start: 2021-05-30 | End: 2021-06-01

## 2021-06-01 DIAGNOSIS — E78.5 HYPERLIPIDEMIA, UNSPECIFIED HYPERLIPIDEMIA TYPE: ICD-10-CM

## 2021-06-01 DIAGNOSIS — E11.9 TYPE 2 DIABETES MELLITUS WITHOUT COMPLICATION, WITH LONG-TERM CURRENT USE OF INSULIN (HCC): ICD-10-CM

## 2021-06-01 DIAGNOSIS — Z79.4 TYPE 2 DIABETES MELLITUS WITHOUT COMPLICATION, WITH LONG-TERM CURRENT USE OF INSULIN (HCC): ICD-10-CM

## 2021-06-01 RX ORDER — INSULIN GLARGINE 100 [IU]/ML
INJECTION, SOLUTION SUBCUTANEOUS
Qty: 30 ML | Refills: 0 | Status: SHIPPED | OUTPATIENT
Start: 2021-06-01 | End: 2021-09-20 | Stop reason: SDUPTHER

## 2021-06-01 RX ORDER — ATORVASTATIN CALCIUM 20 MG/1
TABLET, FILM COATED ORAL
Qty: 90 TABLET | Refills: 0 | Status: SHIPPED | OUTPATIENT
Start: 2021-06-01 | End: 2021-09-20 | Stop reason: SDUPTHER

## 2021-06-05 DIAGNOSIS — E11.9 TYPE 2 DIABETES MELLITUS WITHOUT COMPLICATION, WITH LONG-TERM CURRENT USE OF INSULIN (HCC): Primary | ICD-10-CM

## 2021-06-05 DIAGNOSIS — Z79.4 TYPE 2 DIABETES MELLITUS WITHOUT COMPLICATION, WITH LONG-TERM CURRENT USE OF INSULIN (HCC): Primary | ICD-10-CM

## 2021-06-06 RX ORDER — BLOOD-GLUCOSE SENSOR
EACH MISCELLANEOUS
Qty: 9 EACH | Refills: 0 | Status: SHIPPED | OUTPATIENT
Start: 2021-06-06 | End: 2021-06-07

## 2021-06-07 DIAGNOSIS — E11.9 TYPE 2 DIABETES MELLITUS WITHOUT COMPLICATION, WITH LONG-TERM CURRENT USE OF INSULIN (HCC): Primary | ICD-10-CM

## 2021-06-07 DIAGNOSIS — Z79.4 TYPE 2 DIABETES MELLITUS WITHOUT COMPLICATION, WITH LONG-TERM CURRENT USE OF INSULIN (HCC): Primary | ICD-10-CM

## 2021-06-07 RX ORDER — BLOOD-GLUCOSE SENSOR
EACH MISCELLANEOUS
Qty: 9 EACH | Refills: 0 | Status: SHIPPED | OUTPATIENT
Start: 2021-06-07 | End: 2021-09-27

## 2021-06-07 RX ORDER — BLOOD-GLUCOSE TRANSMITTER
EACH MISCELLANEOUS
Qty: 1 EACH | Refills: 0 | Status: SHIPPED | OUTPATIENT
Start: 2021-06-07 | End: 2021-09-27

## 2021-08-20 DIAGNOSIS — Z11.52 ENCOUNTER FOR SCREENING FOR COVID-19: Primary | ICD-10-CM

## 2021-08-20 PROCEDURE — U0003 INFECTIOUS AGENT DETECTION BY NUCLEIC ACID (DNA OR RNA); SEVERE ACUTE RESPIRATORY SYNDROME CORONAVIRUS 2 (SARS-COV-2) (CORONAVIRUS DISEASE [COVID-19]), AMPLIFIED PROBE TECHNIQUE, MAKING USE OF HIGH THROUGHPUT TECHNOLOGIES AS DESCRIBED BY CMS-2020-01-R: HCPCS | Performed by: INTERNAL MEDICINE

## 2021-08-20 PROCEDURE — U0005 INFEC AGEN DETEC AMPLI PROBE: HCPCS | Performed by: INTERNAL MEDICINE

## 2021-09-03 DIAGNOSIS — Z11.52 ENCOUNTER FOR SCREENING FOR COVID-19: Primary | ICD-10-CM

## 2021-09-03 PROCEDURE — U0005 INFEC AGEN DETEC AMPLI PROBE: HCPCS | Performed by: INTERNAL MEDICINE

## 2021-09-03 PROCEDURE — U0003 INFECTIOUS AGENT DETECTION BY NUCLEIC ACID (DNA OR RNA); SEVERE ACUTE RESPIRATORY SYNDROME CORONAVIRUS 2 (SARS-COV-2) (CORONAVIRUS DISEASE [COVID-19]), AMPLIFIED PROBE TECHNIQUE, MAKING USE OF HIGH THROUGHPUT TECHNOLOGIES AS DESCRIBED BY CMS-2020-01-R: HCPCS | Performed by: INTERNAL MEDICINE

## 2021-09-10 ENCOUNTER — TELEPHONE (OUTPATIENT)
Dept: INTERNAL MEDICINE CLINIC | Facility: CLINIC | Age: 56
End: 2021-09-10

## 2021-09-10 DIAGNOSIS — Z79.4 TYPE 2 DIABETES MELLITUS WITHOUT COMPLICATION, WITH LONG-TERM CURRENT USE OF INSULIN (HCC): Primary | ICD-10-CM

## 2021-09-10 DIAGNOSIS — D75.1 POLYCYTHEMIA: ICD-10-CM

## 2021-09-10 DIAGNOSIS — E11.9 TYPE 2 DIABETES MELLITUS WITHOUT COMPLICATION, WITH LONG-TERM CURRENT USE OF INSULIN (HCC): Primary | ICD-10-CM

## 2021-09-10 NOTE — TELEPHONE ENCOUNTER
He says he needs a CBC and Urine Microalbumin added to his blood work  Put the orders in for CBC and UA, please make sure they are correct and sign orders

## 2021-09-17 ENCOUNTER — APPOINTMENT (OUTPATIENT)
Dept: LAB | Facility: CLINIC | Age: 56
End: 2021-09-17
Payer: COMMERCIAL

## 2021-09-17 DIAGNOSIS — Z79.4 TYPE 2 DIABETES MELLITUS WITHOUT COMPLICATION, WITH LONG-TERM CURRENT USE OF INSULIN (HCC): ICD-10-CM

## 2021-09-17 DIAGNOSIS — D75.1 POLYCYTHEMIA: ICD-10-CM

## 2021-09-17 DIAGNOSIS — E11.9 TYPE 2 DIABETES MELLITUS WITHOUT COMPLICATION, WITH LONG-TERM CURRENT USE OF INSULIN (HCC): ICD-10-CM

## 2021-09-17 LAB
ALBUMIN SERPL BCP-MCNC: 4 G/DL (ref 3.5–5)
ALP SERPL-CCNC: 71 U/L (ref 46–116)
ALT SERPL W P-5'-P-CCNC: 52 U/L (ref 12–78)
ANION GAP SERPL CALCULATED.3IONS-SCNC: 8 MMOL/L (ref 4–13)
AST SERPL W P-5'-P-CCNC: 23 U/L (ref 5–45)
BASOPHILS # BLD AUTO: 0.04 THOUSANDS/ΜL (ref 0–0.1)
BASOPHILS NFR BLD AUTO: 0 % (ref 0–1)
BILIRUB SERPL-MCNC: 0.6 MG/DL (ref 0.2–1)
BUN SERPL-MCNC: 18 MG/DL (ref 5–25)
CALCIUM SERPL-MCNC: 9.7 MG/DL (ref 8.3–10.1)
CHLORIDE SERPL-SCNC: 105 MMOL/L (ref 100–108)
CHOLEST SERPL-MCNC: 135 MG/DL (ref 50–200)
CO2 SERPL-SCNC: 30 MMOL/L (ref 21–32)
CREAT SERPL-MCNC: 1.51 MG/DL (ref 0.6–1.3)
CREAT UR-MCNC: 194 MG/DL
EOSINOPHIL # BLD AUTO: 1.02 THOUSAND/ΜL (ref 0–0.61)
EOSINOPHIL NFR BLD AUTO: 11 % (ref 0–6)
ERYTHROCYTE [DISTWIDTH] IN BLOOD BY AUTOMATED COUNT: 12.9 % (ref 11.6–15.1)
EST. AVERAGE GLUCOSE BLD GHB EST-MCNC: 192 MG/DL
GFR SERPL CREATININE-BSD FRML MDRD: 51 ML/MIN/1.73SQ M
GLUCOSE P FAST SERPL-MCNC: 171 MG/DL (ref 65–99)
HBA1C MFR BLD: 8.3 %
HCT VFR BLD AUTO: 51.3 % (ref 36.5–49.3)
HDLC SERPL-MCNC: 32 MG/DL
HGB BLD-MCNC: 16.2 G/DL (ref 12–17)
IMM GRANULOCYTES # BLD AUTO: 0.04 THOUSAND/UL (ref 0–0.2)
IMM GRANULOCYTES NFR BLD AUTO: 0 % (ref 0–2)
LDLC SERPL CALC-MCNC: 59 MG/DL (ref 0–100)
LYMPHOCYTES # BLD AUTO: 2.94 THOUSANDS/ΜL (ref 0.6–4.47)
LYMPHOCYTES NFR BLD AUTO: 31 % (ref 14–44)
MCH RBC QN AUTO: 27.8 PG (ref 26.8–34.3)
MCHC RBC AUTO-ENTMCNC: 31.6 G/DL (ref 31.4–37.4)
MCV RBC AUTO: 88 FL (ref 82–98)
MICROALBUMIN UR-MCNC: 27.5 MG/L (ref 0–20)
MICROALBUMIN/CREAT 24H UR: 14 MG/G CREATININE (ref 0–30)
MONOCYTES # BLD AUTO: 0.59 THOUSAND/ΜL (ref 0.17–1.22)
MONOCYTES NFR BLD AUTO: 6 % (ref 4–12)
NEUTROPHILS # BLD AUTO: 4.76 THOUSANDS/ΜL (ref 1.85–7.62)
NEUTS SEG NFR BLD AUTO: 52 % (ref 43–75)
NONHDLC SERPL-MCNC: 103 MG/DL
NRBC BLD AUTO-RTO: 0 /100 WBCS
PLATELET # BLD AUTO: 283 THOUSANDS/UL (ref 149–390)
PMV BLD AUTO: 9.2 FL (ref 8.9–12.7)
POTASSIUM SERPL-SCNC: 4 MMOL/L (ref 3.5–5.3)
PROT SERPL-MCNC: 7 G/DL (ref 6.4–8.2)
RBC # BLD AUTO: 5.82 MILLION/UL (ref 3.88–5.62)
SODIUM SERPL-SCNC: 143 MMOL/L (ref 136–145)
TRIGL SERPL-MCNC: 219 MG/DL
WBC # BLD AUTO: 9.39 THOUSAND/UL (ref 4.31–10.16)

## 2021-09-17 PROCEDURE — 82570 ASSAY OF URINE CREATININE: CPT | Performed by: INTERNAL MEDICINE

## 2021-09-17 PROCEDURE — 82043 UR ALBUMIN QUANTITATIVE: CPT | Performed by: INTERNAL MEDICINE

## 2021-09-17 PROCEDURE — 80053 COMPREHEN METABOLIC PANEL: CPT

## 2021-09-17 PROCEDURE — 80061 LIPID PANEL: CPT

## 2021-09-17 PROCEDURE — 85025 COMPLETE CBC W/AUTO DIFF WBC: CPT

## 2021-09-17 PROCEDURE — 83036 HEMOGLOBIN GLYCOSYLATED A1C: CPT

## 2021-09-17 PROCEDURE — 36415 COLL VENOUS BLD VENIPUNCTURE: CPT

## 2021-09-20 ENCOUNTER — OFFICE VISIT (OUTPATIENT)
Dept: INTERNAL MEDICINE CLINIC | Facility: CLINIC | Age: 56
End: 2021-09-20
Payer: COMMERCIAL

## 2021-09-20 VITALS
HEIGHT: 72 IN | WEIGHT: 193 LBS | DIASTOLIC BLOOD PRESSURE: 96 MMHG | HEART RATE: 112 BPM | BODY MASS INDEX: 26.14 KG/M2 | TEMPERATURE: 98.2 F | SYSTOLIC BLOOD PRESSURE: 142 MMHG | OXYGEN SATURATION: 96 %

## 2021-09-20 DIAGNOSIS — I10 ESSENTIAL HYPERTENSION: ICD-10-CM

## 2021-09-20 DIAGNOSIS — E78.5 HYPERLIPIDEMIA, UNSPECIFIED HYPERLIPIDEMIA TYPE: ICD-10-CM

## 2021-09-20 DIAGNOSIS — Z79.4 TYPE 2 DIABETES MELLITUS WITHOUT COMPLICATION, WITH LONG-TERM CURRENT USE OF INSULIN (HCC): ICD-10-CM

## 2021-09-20 DIAGNOSIS — E11.9 TYPE 2 DIABETES MELLITUS WITHOUT COMPLICATION, WITH LONG-TERM CURRENT USE OF INSULIN (HCC): ICD-10-CM

## 2021-09-20 DIAGNOSIS — Z23 NEED FOR SHINGLES VACCINE: ICD-10-CM

## 2021-09-20 DIAGNOSIS — E11.9 DIABETES TYPE 2, CONTROLLED (HCC): Primary | ICD-10-CM

## 2021-09-20 PROCEDURE — 90750 HZV VACC RECOMBINANT IM: CPT

## 2021-09-20 PROCEDURE — 90471 IMMUNIZATION ADMIN: CPT

## 2021-09-20 PROCEDURE — 99214 OFFICE O/P EST MOD 30 MIN: CPT | Performed by: INTERNAL MEDICINE

## 2021-09-20 RX ORDER — INSULIN ASPART 100 [IU]/ML
35 INJECTION, SOLUTION INTRAVENOUS; SUBCUTANEOUS
Qty: 94.5 ML | Refills: 3 | Status: SHIPPED | OUTPATIENT
Start: 2021-09-20 | End: 2022-05-02 | Stop reason: SDUPTHER

## 2021-09-20 RX ORDER — LOSARTAN POTASSIUM 25 MG/1
50 TABLET ORAL DAILY
Qty: 90 TABLET | Refills: 3 | Status: SHIPPED | OUTPATIENT
Start: 2021-09-20 | End: 2022-02-15

## 2021-09-20 RX ORDER — INSULIN GLARGINE 100 [IU]/ML
45 INJECTION, SOLUTION SUBCUTANEOUS DAILY
Qty: 40.5 ML | Refills: 3 | Status: SHIPPED | OUTPATIENT
Start: 2021-09-20 | End: 2022-02-10 | Stop reason: SDUPTHER

## 2021-09-20 RX ORDER — ATORVASTATIN CALCIUM 20 MG/1
20 TABLET, FILM COATED ORAL
Qty: 90 TABLET | Refills: 0 | Status: SHIPPED | OUTPATIENT
Start: 2021-09-20 | End: 2021-12-01

## 2021-09-20 RX ORDER — EMPAGLIFLOZIN 10 MG/1
10 TABLET, FILM COATED ORAL EVERY MORNING
Qty: 90 TABLET | Refills: 3 | Status: SHIPPED | OUTPATIENT
Start: 2021-09-20

## 2021-09-20 NOTE — PROGRESS NOTES
Assessment/Plan    Office  Visit  Follow up  Of  Diabetes type 2, Hyperlipidemia,  Hypertension      Diagnoses and all orders for this visit:    Diabetes type 2, controlled ([address] )      Hyperlipidemia    Hypertension    Subjective:      Patient ID: Dylon Tena is a 60 y o  male  HPI    The following portions of the patient's history were reviewed and updated as appropriate: allergies, current medications, past family history, past medical history, past social history, past surgical history, and problem list     Review of Systems   Constitutional: Negative  HENT: Negative for dental problem, drooling, ear discharge and ear pain  Eyes: Negative for discharge, redness and itching  Respiratory: Negative for apnea, cough and wheezing  Cardiovascular: Negative for chest pain and palpitations  Gastrointestinal: Negative for abdominal pain, blood in stool, diarrhea and vomiting  Endocrine: Negative for polydipsia, polyphagia and polyuria  Genitourinary: Negative for decreased urine volume, dysuria and frequency  Musculoskeletal: Negative for arthralgias, myalgias and neck stiffness  Skin: Negative for pallor and wound  Allergic/Immunologic: Negative for environmental allergies and food allergies  Neurological: Negative for facial asymmetry, light-headedness, numbness and headaches  Hematological: Negative for adenopathy  Does not bruise/bleed easily  Psychiatric/Behavioral: Negative for agitation, behavioral problems and confusion  Objective: There were no vitals taken for this visit  Physical Exam  Constitutional:       Appearance: Normal appearance  HENT:      Head: Normocephalic  Nose: Nose normal       Mouth/Throat:      Mouth: Mucous membranes are moist    Eyes:      Pupils: Pupils are equal, round, and reactive to light  Cardiovascular:      Rate and Rhythm: Regular rhythm  Heart sounds: Normal heart sounds     Pulmonary:      Breath sounds: Normal breath sounds  Abdominal:      Palpations: Abdomen is soft  Musculoskeletal:         General: No swelling  Cervical back: Neck supple  Skin:     General: Skin is warm  Neurological:      General: No focal deficit present  Mental Status: He is alert and oriented to person, place, and time     Psychiatric:         Mood and Affect: Mood normal

## 2021-09-27 DIAGNOSIS — Z79.4 TYPE 2 DIABETES MELLITUS WITHOUT COMPLICATION, WITH LONG-TERM CURRENT USE OF INSULIN (HCC): ICD-10-CM

## 2021-09-27 DIAGNOSIS — E11.9 TYPE 2 DIABETES MELLITUS WITHOUT COMPLICATION, WITH LONG-TERM CURRENT USE OF INSULIN (HCC): ICD-10-CM

## 2021-09-27 RX ORDER — BLOOD-GLUCOSE SENSOR
EACH MISCELLANEOUS
Qty: 9 EACH | Refills: 0 | Status: SHIPPED | OUTPATIENT
Start: 2021-09-27 | End: 2021-12-01

## 2021-09-27 RX ORDER — BLOOD-GLUCOSE TRANSMITTER
EACH MISCELLANEOUS
Qty: 1 EACH | Refills: 0 | Status: SHIPPED | OUTPATIENT
Start: 2021-09-27 | End: 2021-12-03

## 2021-10-13 ENCOUNTER — TELEPHONE (OUTPATIENT)
Dept: DIABETES SERVICES | Facility: CLINIC | Age: 56
End: 2021-10-13

## 2021-10-30 ENCOUNTER — IMMUNIZATIONS (OUTPATIENT)
Dept: FAMILY MEDICINE CLINIC | Facility: MEDICAL CENTER | Age: 56
End: 2021-10-30

## 2021-10-30 DIAGNOSIS — Z23 ENCOUNTER FOR IMMUNIZATION: Primary | ICD-10-CM

## 2021-10-30 PROCEDURE — 91300 SARSCOV2 VAC 30MCG/0.3ML IM: CPT

## 2021-12-01 DIAGNOSIS — Z79.4 TYPE 2 DIABETES MELLITUS WITHOUT COMPLICATION, WITH LONG-TERM CURRENT USE OF INSULIN (HCC): ICD-10-CM

## 2021-12-01 DIAGNOSIS — E11.9 TYPE 2 DIABETES MELLITUS WITHOUT COMPLICATION, WITH LONG-TERM CURRENT USE OF INSULIN (HCC): ICD-10-CM

## 2021-12-01 DIAGNOSIS — E78.5 HYPERLIPIDEMIA, UNSPECIFIED HYPERLIPIDEMIA TYPE: ICD-10-CM

## 2021-12-01 RX ORDER — BLOOD-GLUCOSE SENSOR
EACH MISCELLANEOUS
Qty: 9 EACH | Refills: 0 | Status: SHIPPED | OUTPATIENT
Start: 2021-12-01 | End: 2022-02-15

## 2021-12-01 RX ORDER — ATORVASTATIN CALCIUM 20 MG/1
TABLET, FILM COATED ORAL
Qty: 90 TABLET | Refills: 0 | Status: SHIPPED | OUTPATIENT
Start: 2021-12-01 | End: 2022-02-15

## 2021-12-03 DIAGNOSIS — E11.9 TYPE 2 DIABETES MELLITUS WITHOUT COMPLICATION, WITH LONG-TERM CURRENT USE OF INSULIN (HCC): ICD-10-CM

## 2021-12-03 DIAGNOSIS — Z79.4 TYPE 2 DIABETES MELLITUS WITHOUT COMPLICATION, WITH LONG-TERM CURRENT USE OF INSULIN (HCC): ICD-10-CM

## 2021-12-03 RX ORDER — BLOOD-GLUCOSE TRANSMITTER
EACH MISCELLANEOUS
Qty: 1 EACH | Refills: 0 | Status: SHIPPED | OUTPATIENT
Start: 2021-12-03 | End: 2022-02-22

## 2022-02-10 DIAGNOSIS — E11.9 TYPE 2 DIABETES MELLITUS WITHOUT COMPLICATION, WITH LONG-TERM CURRENT USE OF INSULIN (HCC): ICD-10-CM

## 2022-02-10 DIAGNOSIS — Z79.4 TYPE 2 DIABETES MELLITUS WITHOUT COMPLICATION, WITH LONG-TERM CURRENT USE OF INSULIN (HCC): ICD-10-CM

## 2022-02-10 RX ORDER — INSULIN GLARGINE 100 [IU]/ML
45 INJECTION, SOLUTION SUBCUTANEOUS DAILY
Qty: 40.5 ML | Refills: 3 | Status: SHIPPED | OUTPATIENT
Start: 2022-02-10 | End: 2022-05-02 | Stop reason: SDUPTHER

## 2022-02-10 NOTE — TELEPHONE ENCOUNTER
Semglee need a refill for this instead of the Lantus     Takes 45 units in the evening         Please order the HgbAlc, Lipid Panil and BMP,Urine mIcroalbumin

## 2022-02-15 DIAGNOSIS — E78.5 HYPERLIPIDEMIA, UNSPECIFIED HYPERLIPIDEMIA TYPE: ICD-10-CM

## 2022-02-15 DIAGNOSIS — E11.9 TYPE 2 DIABETES MELLITUS WITHOUT COMPLICATION, WITH LONG-TERM CURRENT USE OF INSULIN (HCC): Primary | ICD-10-CM

## 2022-02-15 DIAGNOSIS — I10 ESSENTIAL HYPERTENSION: ICD-10-CM

## 2022-02-15 DIAGNOSIS — Z79.4 TYPE 2 DIABETES MELLITUS WITHOUT COMPLICATION, WITH LONG-TERM CURRENT USE OF INSULIN (HCC): Primary | ICD-10-CM

## 2022-02-15 RX ORDER — LOSARTAN POTASSIUM 25 MG/1
TABLET ORAL
Qty: 180 TABLET | Refills: 0 | Status: SHIPPED | OUTPATIENT
Start: 2022-02-15 | End: 2022-05-02 | Stop reason: SDUPTHER

## 2022-02-15 RX ORDER — PEN NEEDLE, DIABETIC 32GX 5/32"
NEEDLE, DISPOSABLE MISCELLANEOUS
Qty: 200 EACH | Refills: 0 | Status: SHIPPED | OUTPATIENT
Start: 2022-02-15

## 2022-02-15 RX ORDER — ATORVASTATIN CALCIUM 20 MG/1
TABLET, FILM COATED ORAL
Qty: 90 TABLET | Refills: 0 | Status: SHIPPED | OUTPATIENT
Start: 2022-02-15 | End: 2022-05-02 | Stop reason: SDUPTHER

## 2022-02-15 RX ORDER — BLOOD-GLUCOSE SENSOR
EACH MISCELLANEOUS
Qty: 9 EACH | Refills: 0 | Status: SHIPPED | OUTPATIENT
Start: 2022-02-15 | End: 2022-06-08 | Stop reason: SDUPTHER

## 2022-02-21 DIAGNOSIS — E11.9 TYPE 2 DIABETES MELLITUS WITHOUT COMPLICATION, WITH LONG-TERM CURRENT USE OF INSULIN (HCC): ICD-10-CM

## 2022-02-21 DIAGNOSIS — Z79.4 TYPE 2 DIABETES MELLITUS WITHOUT COMPLICATION, WITH LONG-TERM CURRENT USE OF INSULIN (HCC): ICD-10-CM

## 2022-02-22 RX ORDER — BLOOD-GLUCOSE TRANSMITTER
EACH MISCELLANEOUS
Qty: 1 EACH | Refills: 0 | Status: SHIPPED | OUTPATIENT
Start: 2022-02-22 | End: 2022-06-08 | Stop reason: SDUPTHER

## 2022-04-04 ENCOUNTER — TELEPHONE (OUTPATIENT)
Dept: INTERNAL MEDICINE CLINIC | Facility: CLINIC | Age: 57
End: 2022-04-04

## 2022-04-04 ENCOUNTER — CLINICAL SUPPORT (OUTPATIENT)
Dept: INTERNAL MEDICINE CLINIC | Facility: CLINIC | Age: 57
End: 2022-04-04
Payer: COMMERCIAL

## 2022-04-04 DIAGNOSIS — Z23 NEED FOR SHINGLES VACCINE: Primary | ICD-10-CM

## 2022-04-04 DIAGNOSIS — D64.9 ANEMIA, UNSPECIFIED TYPE: Primary | ICD-10-CM

## 2022-04-04 DIAGNOSIS — E11.9 TYPE 2 DIABETES MELLITUS WITHOUT COMPLICATION, WITHOUT LONG-TERM CURRENT USE OF INSULIN (HCC): ICD-10-CM

## 2022-04-04 DIAGNOSIS — E78.5 HYPERLIPIDEMIA, UNSPECIFIED HYPERLIPIDEMIA TYPE: ICD-10-CM

## 2022-04-04 PROCEDURE — 90471 IMMUNIZATION ADMIN: CPT

## 2022-04-04 PROCEDURE — 90750 HZV VACC RECOMBINANT IM: CPT

## 2022-04-04 NOTE — TELEPHONE ENCOUNTER
Pt would like you to to send scripts for blood work before his next up coming appt     Pt would like cbc, DM,  A1c, fasting lipids, Polyrythemic, CKD2, hyperlipemic, and urine test

## 2022-04-28 ENCOUNTER — APPOINTMENT (OUTPATIENT)
Dept: LAB | Facility: CLINIC | Age: 57
End: 2022-04-28
Payer: COMMERCIAL

## 2022-04-28 DIAGNOSIS — E78.5 HYPERLIPIDEMIA, UNSPECIFIED HYPERLIPIDEMIA TYPE: ICD-10-CM

## 2022-04-28 DIAGNOSIS — D64.9 ANEMIA, UNSPECIFIED TYPE: ICD-10-CM

## 2022-04-28 DIAGNOSIS — E11.9 TYPE 2 DIABETES MELLITUS WITHOUT COMPLICATION, WITHOUT LONG-TERM CURRENT USE OF INSULIN (HCC): ICD-10-CM

## 2022-04-28 LAB
ALBUMIN SERPL BCP-MCNC: 3.9 G/DL (ref 3.5–5)
ALP SERPL-CCNC: 68 U/L (ref 46–116)
ALT SERPL W P-5'-P-CCNC: 52 U/L (ref 12–78)
ANION GAP SERPL CALCULATED.3IONS-SCNC: 12 MMOL/L (ref 4–13)
AST SERPL W P-5'-P-CCNC: 24 U/L (ref 5–45)
BASOPHILS # BLD AUTO: 0.04 THOUSANDS/ΜL (ref 0–0.1)
BASOPHILS NFR BLD AUTO: 1 % (ref 0–1)
BILIRUB SERPL-MCNC: 0.87 MG/DL (ref 0.2–1)
BUN SERPL-MCNC: 24 MG/DL (ref 5–25)
CALCIUM SERPL-MCNC: 10 MG/DL (ref 8.3–10.1)
CHLORIDE SERPL-SCNC: 104 MMOL/L (ref 100–108)
CHOLEST SERPL-MCNC: 134 MG/DL
CO2 SERPL-SCNC: 27 MMOL/L (ref 21–32)
CREAT SERPL-MCNC: 1.66 MG/DL (ref 0.6–1.3)
CREAT UR-MCNC: 117 MG/DL
EOSINOPHIL # BLD AUTO: 0.63 THOUSAND/ΜL (ref 0–0.61)
EOSINOPHIL NFR BLD AUTO: 7 % (ref 0–6)
ERYTHROCYTE [DISTWIDTH] IN BLOOD BY AUTOMATED COUNT: 13.2 % (ref 11.6–15.1)
EST. AVERAGE GLUCOSE BLD GHB EST-MCNC: 171 MG/DL
GFR SERPL CREATININE-BSD FRML MDRD: 44 ML/MIN/1.73SQ M
GLUCOSE P FAST SERPL-MCNC: 189 MG/DL (ref 65–99)
HBA1C MFR BLD: 7.6 %
HCT VFR BLD AUTO: 54.9 % (ref 36.5–49.3)
HDLC SERPL-MCNC: 33 MG/DL
HGB BLD-MCNC: 17.8 G/DL (ref 12–17)
IMM GRANULOCYTES # BLD AUTO: 0.05 THOUSAND/UL (ref 0–0.2)
IMM GRANULOCYTES NFR BLD AUTO: 1 % (ref 0–2)
LDLC SERPL CALC-MCNC: 60 MG/DL (ref 0–100)
LYMPHOCYTES # BLD AUTO: 3.01 THOUSANDS/ΜL (ref 0.6–4.47)
LYMPHOCYTES NFR BLD AUTO: 35 % (ref 14–44)
MCH RBC QN AUTO: 28.4 PG (ref 26.8–34.3)
MCHC RBC AUTO-ENTMCNC: 32.4 G/DL (ref 31.4–37.4)
MCV RBC AUTO: 88 FL (ref 82–98)
MICROALBUMIN UR-MCNC: 19.7 MG/L (ref 0–20)
MICROALBUMIN/CREAT 24H UR: 17 MG/G CREATININE (ref 0–30)
MONOCYTES # BLD AUTO: 0.59 THOUSAND/ΜL (ref 0.17–1.22)
MONOCYTES NFR BLD AUTO: 7 % (ref 4–12)
NEUTROPHILS # BLD AUTO: 4.36 THOUSANDS/ΜL (ref 1.85–7.62)
NEUTS SEG NFR BLD AUTO: 49 % (ref 43–75)
NONHDLC SERPL-MCNC: 101 MG/DL
NRBC BLD AUTO-RTO: 0 /100 WBCS
PLATELET # BLD AUTO: 273 THOUSANDS/UL (ref 149–390)
PMV BLD AUTO: 9.3 FL (ref 8.9–12.7)
POTASSIUM SERPL-SCNC: 4.3 MMOL/L (ref 3.5–5.3)
PROT SERPL-MCNC: 7.6 G/DL (ref 6.4–8.2)
RBC # BLD AUTO: 6.26 MILLION/UL (ref 3.88–5.62)
SODIUM SERPL-SCNC: 143 MMOL/L (ref 136–145)
TRIGL SERPL-MCNC: 204 MG/DL
WBC # BLD AUTO: 8.68 THOUSAND/UL (ref 4.31–10.16)

## 2022-04-28 PROCEDURE — 83036 HEMOGLOBIN GLYCOSYLATED A1C: CPT

## 2022-04-28 PROCEDURE — 82570 ASSAY OF URINE CREATININE: CPT | Performed by: INTERNAL MEDICINE

## 2022-04-28 PROCEDURE — 80061 LIPID PANEL: CPT

## 2022-04-28 PROCEDURE — 82043 UR ALBUMIN QUANTITATIVE: CPT | Performed by: INTERNAL MEDICINE

## 2022-04-28 PROCEDURE — 85025 COMPLETE CBC W/AUTO DIFF WBC: CPT

## 2022-04-28 PROCEDURE — 80053 COMPREHEN METABOLIC PANEL: CPT

## 2022-04-28 PROCEDURE — 36415 COLL VENOUS BLD VENIPUNCTURE: CPT

## 2022-05-02 ENCOUNTER — OFFICE VISIT (OUTPATIENT)
Dept: INTERNAL MEDICINE CLINIC | Facility: CLINIC | Age: 57
End: 2022-05-02
Payer: COMMERCIAL

## 2022-05-02 VITALS
DIASTOLIC BLOOD PRESSURE: 80 MMHG | SYSTOLIC BLOOD PRESSURE: 120 MMHG | TEMPERATURE: 98.5 F | HEIGHT: 72 IN | WEIGHT: 203 LBS | OXYGEN SATURATION: 96 % | BODY MASS INDEX: 27.5 KG/M2 | HEART RATE: 112 BPM

## 2022-05-02 DIAGNOSIS — Z79.4 TYPE 2 DIABETES MELLITUS WITHOUT COMPLICATION, WITH LONG-TERM CURRENT USE OF INSULIN (HCC): ICD-10-CM

## 2022-05-02 DIAGNOSIS — I10 ESSENTIAL HYPERTENSION: ICD-10-CM

## 2022-05-02 DIAGNOSIS — G47.34 IDIOPATHIC SLEEP RELATED NONOBSTRUCTIVE ALVEOLAR HYPOVENTILATION: ICD-10-CM

## 2022-05-02 DIAGNOSIS — G47.30 SLEEP APNEA IN ADULT: Primary | ICD-10-CM

## 2022-05-02 DIAGNOSIS — E11.9 TYPE 2 DIABETES MELLITUS WITHOUT COMPLICATION, WITH LONG-TERM CURRENT USE OF INSULIN (HCC): ICD-10-CM

## 2022-05-02 DIAGNOSIS — E78.5 HYPERLIPIDEMIA, UNSPECIFIED HYPERLIPIDEMIA TYPE: ICD-10-CM

## 2022-05-02 PROCEDURE — 99214 OFFICE O/P EST MOD 30 MIN: CPT | Performed by: INTERNAL MEDICINE

## 2022-05-02 RX ORDER — INSULIN GLARGINE 100 [IU]/ML
45 INJECTION, SOLUTION SUBCUTANEOUS DAILY
Qty: 40.5 ML | Refills: 0 | Status: SHIPPED | OUTPATIENT
Start: 2022-05-02 | End: 2022-05-06 | Stop reason: SDUPTHER

## 2022-05-02 RX ORDER — BLOOD-GLUCOSE SENSOR
EACH MISCELLANEOUS
Qty: 9 EACH | Refills: 0 | Status: CANCELLED | OUTPATIENT
Start: 2022-05-02

## 2022-05-02 RX ORDER — INSULIN ASPART 100 [IU]/ML
35 INJECTION, SOLUTION INTRAVENOUS; SUBCUTANEOUS
Qty: 94.5 ML | Refills: 3 | Status: SHIPPED | OUTPATIENT
Start: 2022-05-02 | End: 2022-07-31

## 2022-05-02 RX ORDER — LOSARTAN POTASSIUM 25 MG/1
50 TABLET ORAL DAILY
Qty: 180 TABLET | Refills: 0 | Status: SHIPPED | OUTPATIENT
Start: 2022-05-02

## 2022-05-02 RX ORDER — BLOOD-GLUCOSE TRANSMITTER
EACH MISCELLANEOUS
Qty: 1 EACH | Refills: 0 | Status: CANCELLED | OUTPATIENT
Start: 2022-05-02

## 2022-05-02 RX ORDER — ATORVASTATIN CALCIUM 20 MG/1
20 TABLET, FILM COATED ORAL
Qty: 90 TABLET | Refills: 0 | Status: SHIPPED | OUTPATIENT
Start: 2022-05-02

## 2022-05-02 NOTE — PROGRESS NOTES
Assessment/Plan:    Follow up   Diabetes,  CKD  Stage 4, Hyperlipidemia     Diagnoses and all orders for this visit:    Type 2 diabetes mellitus without complication, with long-term current use of insulin (HCC)  -     insulin aspart (NovoLOG FlexPen) 100 UNIT/ML injection pen; Inject 35 Units under the skin 3 (three) times a day with meals  -     insulin glargine (Lantus SoloStar) 100 units/mL injection pen; Inject 45 Units under the skin daily 40 units twice a day  -     Ambulatory Referral to Endocrinology; Future  -     Ambulatory Referral to Nephrology; Future    Hyperlipidemia, unspecified hyperlipidemia type  -     atorvastatin (LIPITOR) 20 mg tablet; Take 1 tablet (20 mg total) by mouth daily at bedtime    Essential hypertension  -     losartan (COZAAR) 25 mg tablet; Take 2 tablets (50 mg total) by mouth daily    Idiopathic sleep related nonobstructive alveolar hypoventilation  -     Home Study; Future          Subjective:      Patient ID: Brenda Armendariz is a 60 y o  male  HPI    The following portions of the patient's history were reviewed and updated as appropriate: allergies, current medications, past family history, past medical history, past social history, past surgical history, and problem list     Review of Systems   Constitutional: Negative  HENT: Negative for dental problem, drooling, ear discharge and ear pain  Eyes: Negative for discharge, redness and itching  Respiratory: Negative for apnea, cough and wheezing  Cardiovascular: Negative for chest pain and palpitations  Gastrointestinal: Negative for abdominal pain, blood in stool, diarrhea and vomiting  Endocrine: Negative for polydipsia, polyphagia and polyuria  Genitourinary: Negative for decreased urine volume, dysuria and frequency  Musculoskeletal: Negative for arthralgias, myalgias and neck stiffness  Skin: Negative for pallor and wound     Allergic/Immunologic: Negative for environmental allergies and food allergies  Neurological: Negative for facial asymmetry, light-headedness, numbness and headaches  Hematological: Negative for adenopathy  Does not bruise/bleed easily  Psychiatric/Behavioral: Negative for agitation, behavioral problems and confusion  Objective:      /80 (BP Location: Left arm, Patient Position: Sitting, Cuff Size: Adult)   Pulse (!) 112   Temp 98 5 °F (36 9 °C) (Temporal)   Ht 6' (1 829 m)   Wt 92 1 kg (203 lb)   SpO2 96%   BMI 27 53 kg/m²          Physical Exam  Constitutional:       Appearance: Normal appearance  He is obese  HENT:      Head: Normocephalic  Nose: Nose normal       Mouth/Throat:      Mouth: Mucous membranes are moist    Eyes:      Pupils: Pupils are equal, round, and reactive to light  Cardiovascular:      Rate and Rhythm: Regular rhythm  Heart sounds: Normal heart sounds  Pulmonary:      Breath sounds: Normal breath sounds  Abdominal:      Palpations: Abdomen is soft  Musculoskeletal:         General: No swelling  Cervical back: Neck supple  Skin:     General: Skin is warm  Neurological:      General: No focal deficit present  Mental Status: He is alert and oriented to person, place, and time  Psychiatric:         Mood and Affect: Mood normal        Diabetes   Referred to  Endocrine  For  Better  Control  Of  Hemoglobin  A1C    Hypertension  ,  Hyperlipidemia  Referred for  Neproconsult       Sleep  Study  To  Rule  Out  Sleep  Apnea  Fup  6 months          LIVAN Goncalves MD

## 2022-05-03 DIAGNOSIS — D75.1 ERYTHROCYTOSIS: Primary | ICD-10-CM

## 2022-05-03 DIAGNOSIS — Z79.4 TYPE 2 DIABETES MELLITUS WITHOUT COMPLICATION, WITH LONG-TERM CURRENT USE OF INSULIN (HCC): ICD-10-CM

## 2022-05-03 DIAGNOSIS — E11.9 TYPE 2 DIABETES MELLITUS WITHOUT COMPLICATION, WITH LONG-TERM CURRENT USE OF INSULIN (HCC): ICD-10-CM

## 2022-05-03 RX ORDER — INSULIN GLARGINE 100 [IU]/ML
INJECTION, SOLUTION SUBCUTANEOUS
COMMUNITY
End: 2022-05-06

## 2022-05-03 NOTE — TELEPHONE ENCOUNTER
Lantus is not covered by his insurance any more, will need to switch to   [facility]    He uses pens, so please make sure new prescription of for pens

## 2022-05-04 NOTE — TELEPHONE ENCOUNTER
I don't see Semglee in pens either  Please call the pharmacy and ask if it comes that way because you can't find pens specifically  You can probably order just the solution and then specify instructions in the Pharmacy notes  Also, please make sure the doctor gives you the instructions for the pt  That way when you speak to the pharmacy they can help you order the correct qty

## 2022-05-05 ENCOUNTER — APPOINTMENT (OUTPATIENT)
Dept: LAB | Facility: CLINIC | Age: 57
End: 2022-05-05
Payer: COMMERCIAL

## 2022-05-05 DIAGNOSIS — D75.1 ERYTHROCYTOSIS: ICD-10-CM

## 2022-05-05 PROCEDURE — 36415 COLL VENOUS BLD VENIPUNCTURE: CPT

## 2022-05-05 PROCEDURE — 82668 ASSAY OF ERYTHROPOIETIN: CPT

## 2022-05-06 ENCOUNTER — HOSPITAL ENCOUNTER (OUTPATIENT)
Dept: SLEEP CENTER | Facility: CLINIC | Age: 57
Discharge: HOME | End: 2022-05-06
Payer: COMMERCIAL

## 2022-05-06 DIAGNOSIS — Z79.4 TYPE 2 DIABETES MELLITUS WITHOUT COMPLICATION, WITH LONG-TERM CURRENT USE OF INSULIN (HCC): Primary | ICD-10-CM

## 2022-05-06 DIAGNOSIS — G47.34 IDIOPATHIC SLEEP RELATED NONOBSTRUCTIVE ALVEOLAR HYPOVENTILATION: ICD-10-CM

## 2022-05-06 DIAGNOSIS — E11.9 TYPE 2 DIABETES MELLITUS WITHOUT COMPLICATION, WITH LONG-TERM CURRENT USE OF INSULIN (HCC): Primary | ICD-10-CM

## 2022-05-06 LAB — EPO SERPL-ACNC: 9.1 MIU/ML (ref 2.6–18.5)

## 2022-05-06 PROCEDURE — G0399 HOME SLEEP TEST/TYPE 3 PORTA: HCPCS

## 2022-05-06 RX ORDER — INSULIN GLARGINE-YFGN 100 [IU]/ML
25 INJECTION, SOLUTION SUBCUTANEOUS DAILY
Qty: 100 ML | Refills: 6 | Status: SHIPPED | OUTPATIENT
Start: 2022-05-06

## 2022-05-06 RX ORDER — INSULIN GLARGINE 100 [IU]/ML
INJECTION, SOLUTION SUBCUTANEOUS
COMMUNITY
End: 2022-05-06

## 2022-05-06 RX ORDER — INSULIN GLARGINE 100 [IU]/ML
45 INJECTION, SOLUTION SUBCUTANEOUS DAILY
Qty: 10 ML | Refills: 4 | Status: CANCELLED | OUTPATIENT
Start: 2022-05-06

## 2022-05-06 RX ORDER — INSULIN GLARGINE 100 [IU]/ML
INJECTION, SOLUTION SUBCUTANEOUS
COMMUNITY

## 2022-05-06 RX ORDER — INSULIN GLARGINE 100 [IU]/ML
45 INJECTION, SOLUTION SUBCUTANEOUS DAILY
Qty: 40.5 ML | Refills: 0 | Status: SHIPPED | OUTPATIENT
Start: 2022-05-06 | End: 2022-08-04

## 2022-05-06 RX ORDER — INSULIN GLARGINE-YFGN 100 [IU]/ML
INJECTION, SOLUTION SUBCUTANEOUS
COMMUNITY
End: 2022-05-06 | Stop reason: SDUPTHER

## 2022-05-06 NOTE — TELEPHONE ENCOUNTER
semglee is Glargine, script is ready, does he want it to go to [facility]?

## 2022-05-07 NOTE — PROGRESS NOTES
Home Sleep Study Documentation    Pre-Sleep Home Study:    Set-up and instructions performed by: PETER Mccoy    Technician performed demonstration for Patient: yes    Return demonstration performed by Patient: yes    Written instructions provided to Patient: yes    Patient signed consent form: yes        Post-Sleep Home Study:    Additional comments by Patient: None    Home Sleep Study Failed:no:    Failure reason: N/A    Reported or Detected: N/A    Scored by: JEWEL Carter RPSGT

## 2022-05-11 PROCEDURE — 95806 SLEEP STUDY UNATT&RESP EFFT: CPT | Performed by: INTERNAL MEDICINE

## 2022-05-12 ENCOUNTER — APPOINTMENT (OUTPATIENT)
Dept: LAB | Facility: CLINIC | Age: 57
End: 2022-05-12
Payer: COMMERCIAL

## 2022-05-12 DIAGNOSIS — C80.1: ICD-10-CM

## 2022-05-12 PROCEDURE — 36415 COLL VENOUS BLD VENIPUNCTURE: CPT

## 2022-05-12 PROCEDURE — 81279 JAK2 GENE TRGT SEQUENCE ALYS: CPT

## 2022-05-17 ENCOUNTER — HOSPITAL ENCOUNTER (OUTPATIENT)
Dept: ULTRASOUND IMAGING | Facility: HOSPITAL | Age: 57
Discharge: HOME | End: 2022-05-17
Attending: INTERNAL MEDICINE
Payer: COMMERCIAL

## 2022-05-17 ENCOUNTER — OFFICE VISIT (OUTPATIENT)
Dept: SLEEP CENTER | Facility: HOSPITAL | Age: 57
End: 2022-05-17
Payer: COMMERCIAL

## 2022-05-17 VITALS
HEIGHT: 72 IN | WEIGHT: 203 LBS | DIASTOLIC BLOOD PRESSURE: 92 MMHG | SYSTOLIC BLOOD PRESSURE: 168 MMHG | BODY MASS INDEX: 27.5 KG/M2

## 2022-05-17 DIAGNOSIS — E11.9 TYPE 2 DIABETES MELLITUS WITHOUT COMPLICATION, WITHOUT LONG-TERM CURRENT USE OF INSULIN (HCC): ICD-10-CM

## 2022-05-17 DIAGNOSIS — R40.0 DAYTIME SLEEPINESS: ICD-10-CM

## 2022-05-17 DIAGNOSIS — D75.1 POLYCYTHEMIA: ICD-10-CM

## 2022-05-17 DIAGNOSIS — G47.33 OBSTRUCTIVE SLEEP APNEA: Primary | ICD-10-CM

## 2022-05-17 DIAGNOSIS — E66.3 OVERWEIGHT (BMI 25.0-29.9): ICD-10-CM

## 2022-05-17 DIAGNOSIS — Z91.09 ENVIRONMENTAL ALLERGIES: ICD-10-CM

## 2022-05-17 DIAGNOSIS — J35.1 TONSILLAR HYPERTROPHY: ICD-10-CM

## 2022-05-17 DIAGNOSIS — D45 POLYCYTHEMIA VERA (HCC): Primary | ICD-10-CM

## 2022-05-17 DIAGNOSIS — I10 ESSENTIAL HYPERTENSION: ICD-10-CM

## 2022-05-17 PROCEDURE — 76700 US EXAM ABDOM COMPLETE: CPT

## 2022-05-17 PROCEDURE — 99244 OFF/OP CNSLTJ NEW/EST MOD 40: CPT | Performed by: INTERNAL MEDICINE

## 2022-05-17 NOTE — PATIENT INSTRUCTIONS

## 2022-05-17 NOTE — PROGRESS NOTES
Consultation - [facility] Nadine  60 y o  male  :1965  AMAURY:[id number]  DOS:2022    Physician Requesting Consult: Ruth Nguyen MD             Reason for Consult : At your kind request I saw [facility] for initial sleep evaluation today  Home sleep testing was undertaken to evaluate for sleep disordered breathing and patient is here accompanied by his wife to review results and further options  The study demonstrated : NENA (respiratory event index of) 8 5 /hour  Minimum oxygen saturation 74 %  and 9 3% of total sleep time was spent with saturations less than 90%  The snore index was 16 6%  PFSH, Problem List, Medications & Allergies were reviewed in EMR  Desiree Cifuentes  has a past medical history of Diabetes mellitus ([address] ) and Hyperlipidemia  He has a current medication list which includes the following prescription(s): atorvastatin, bd pen needle rory u/f, dexcom g6 sensor, dexcom g6 transmitter, insulin aspart, lantus solostar, insulin glargine, insulin glargine, insulin glargine-yfgn, losartan, multivitamin-iron-minerals-folic acid, and jardiance  HPI:  Study was undertaken for wife's complaints of loud snoring of several years duration that has escalated over time  At times he awakens himself with gagging  He is not aware of modifying factors  Other Complaints: none  Restless Leg Syndrome: reports no suggestive symptoms  Parasomnia: no features reported    Sleep Routine (on average):   Typical Bedtime:  10:00 p m  Gets OOB:  7:00 a m  TIB:9 hrs  Sleep latency: upto 60 minutes while working all watching TV but falls asleep easily once he turns of the lights  Sleep Interruptions:1-2/nite because of nocturia and is able to fall back asleep  Awakens: spontaneously  Upon awakening: feels refreshed   Desiree Cifuentes reports  Daytime Sleepiness feels like napping but avoids   He rated himself at Total score: 1 /24 on the [place] Sleepiness Scale       Habits: reports that he has never smoked  He has never used smokeless tobacco  ;   E-Cigarette/Vaping    ;  reports no history of drug use ;  reports current alcohol use of about 1 0 standard drink of alcohol per week  ; Caffeine use:moderate until around 6:00 p m ; Exercise routine: sometimes   Family History:  Suspects mother may have had obstructive sleep apnea  ROS - reviewed and as attached  Significant for around 20 lb weight gain in the past 6 months  Dereje Herzog EXAM:  /92 (BP Location: Right arm, Patient Position: Sitting, Cuff Size: Large)   Ht 6' (1 829 m)   Wt 92 1 kg (203 lb)   BMI 27 53 kg/m²    General: Well groomed male, well appearing, in no apparent distress  Neurological: Alert and orientated;  cooperative; Cranial nerves intact;    Psychiatric: Speech:clear and coherent;  Normal mood, affect & thought   Skin: warm and dry; Color& Hydration good; no facial rashes or lesions   HEENT:  Craniofacial anatomy: normal Sinuses: non- tender  TMJ: Normal    Eyes: EOM's intact;  conjunctiva/corneas clear   Ears: Externallyappear normal     Nasal Airway: is patent Septum:intact; Mucosa: normal; Turbinates:  Hypertrophy; Rhinorrhea: None   Mouth: Lips: normal posture; Dentition: normal   Mucosa:moist  ; Hard Palate:normal    Oropharryx: crowded, AP narrowing  and laterally narrowedTongue: Mallampati:Class IV, Mobile and ScallopedSoft Palate:  redundant  Tonsils: 2+   Neck:is thick; Neck Circumference: 17 "; Supple; no abnormal masses; Thyroid:normal  Trachea:central     Lymph: No Cervical or Submandibular Lymhadenopathy  Heart: S1,S2 normal; RRR; no gallop; no murmurs   Lungs: Respiratory Effort:normal  Air entry good bilaterally  No wheezes  No rales  Abdomen: Obese, Soft & non-tender    Extremities: No pedal edema  No clubbing or cyanosis  Musculoskeletal:  Motor normal; Gait:normal        IMPRESSION: Primary/Secondary Sleep Diagnoses (to Medical or Psych conditions) & Comorbidities   1   Obstructive sleep apnea  Ambulatory Referral to Sleep Medicine    Cpap DME   2  Daytime sleepiness     3  Environmental allergies     4  Tonsillar hypertrophy     5  Essential hypertension     6  Type 2 diabetes mellitus without complication, without long-term current use of insulin (HCC)     7  Overweight (BMI 25 0-29  9)          PLAN:   1  I reviewed results of the Sleep studies with the patient  2  With respect to above conditions, I counseled on pathophysiology, diagnosis, treatment options, risks and benefits; inter-relationship and effects on symptoms and comorbidities; risks of no treatment; costs and insurance aspects  3  Patient elected positive airway pressure therapy and care coordinated with the DME provider for set-up  4  Need for compliance with therapy and weight reduction were emphasized  5  Also advised ensuring adequate treatment of his allergies  6  Follow-up to be scheduled in 2 months to monitor compliance, progress and to adjust therapy  Thank you for allowing me to participate in the care of this patient  I will keep you apprised of developments  Sincerely,     Authenticated electronically by Wolf Hodges MD   on 59/71/92   Board Certified Specialist     Portions of the record may have been created with voice recognition software  Occasional wrong word or "sound a like" substitutions may have occurred due to the inherent limitations of voice recognition software  There may also be notations and random deletions of words or characters from malfunctioning software  Read the chart carefully and recognize, using context, where substitutions/deletions have occurred

## 2022-05-17 NOTE — PROGRESS NOTES
Review of Systems      Genitourinary need to urinate more than twice a night   Cardiology none   Gastrointestinal none   Neurology none   Constitutional none   Integumentary none   Psychiatry none   Musculoskeletal none   Pulmonary snoring   ENT throat clearing   Endocrine frequent urination   Hematological none

## 2022-05-18 ENCOUNTER — TELEPHONE (OUTPATIENT)
Dept: SLEEP CENTER | Facility: CLINIC | Age: 57
End: 2022-05-18

## 2022-05-18 NOTE — TELEPHONE ENCOUNTER
Spoke with the patient scheduled him for DME set up   RX for DME and clinicals sent to [facility] via Sofia Sumner on wait list for compliance appointment   Patient needs compliance between 7/7/2022 and 9/6/2022

## 2022-05-31 LAB — SCAN RESULT: NORMAL

## 2022-06-03 ENCOUNTER — TELEPHONE (OUTPATIENT)
Dept: NEPHROLOGY | Facility: CLINIC | Age: 57
End: 2022-06-03

## 2022-06-03 NOTE — TELEPHONE ENCOUNTER
New Patient Intake Form   Patient Details   Nova Briseno     1965     [id number]     Appointment Information   Who is calling to schedule? If not patient, what is callers name? Patient    Referring Provider  PCP   Referring Provider Number [id number]   Reason for Appt (Diagnosis) E11 9, Z79 4 (ICD-10-CM) - Type 2 diabetes mellitus without complication, with long-term current use of insulin ([facility] [address] )   Is patient aware of why they are being referred? Yes   Does Patient have labs done at [place] [address]? If not, where do they go? [facility]   Has patient had labs / urine work done? List date of most recent lab / urine work 4/28/22 5/5/22 5/12/22   Has patient had a BMP & CBC done in the past 2 years? If so, list the date 4/28/22   Has patient been hospitalized recently? If yes, list name and location of hospital they were in No   Has patient been seen by a Nephrologist before? If yes, list name, location and phone number No   Has patient been see by another Specialty before (ex  Neurology, urology, cardiology)? If yes, please list name, and specialty Sleep Medicine Dr Rama Harp [phone]   Has the patient had imaging done? If so, list the most recent date and type of imaging 5/17/22   Does the patient has a stone analysis report if history of kidney stones? N/a   Appointment Details   Is there a referral on file? Yes   Appointment Date 10/10   Location [place]   Miscellaneous  Requested Selwyn Irvin and a Monday or Tuesday appt only

## 2022-06-07 ENCOUNTER — TELEPHONE (OUTPATIENT)
Dept: SLEEP CENTER | Facility: CLINIC | Age: 57
End: 2022-06-07

## 2022-06-07 LAB

## 2022-06-07 NOTE — TELEPHONE ENCOUNTER
Pt  was set up today in [facility] on Guillermina 2 Auto CPAP 6-16cm and given F20 (M) mask

## 2022-06-08 ENCOUNTER — PATIENT MESSAGE (OUTPATIENT)
Dept: INTERNAL MEDICINE CLINIC | Facility: CLINIC | Age: 57
End: 2022-06-08

## 2022-06-08 DIAGNOSIS — Z79.4 TYPE 2 DIABETES MELLITUS WITHOUT COMPLICATION, WITH LONG-TERM CURRENT USE OF INSULIN (HCC): ICD-10-CM

## 2022-06-08 DIAGNOSIS — E11.9 TYPE 2 DIABETES MELLITUS WITHOUT COMPLICATION, WITH LONG-TERM CURRENT USE OF INSULIN (HCC): ICD-10-CM

## 2022-06-09 RX ORDER — BLOOD-GLUCOSE TRANSMITTER
EACH MISCELLANEOUS
Qty: 1 EACH | Refills: 0 | Status: SHIPPED | OUTPATIENT
Start: 2022-06-09 | End: 2022-06-14 | Stop reason: SDUPTHER

## 2022-06-09 RX ORDER — BLOOD-GLUCOSE SENSOR
EACH MISCELLANEOUS
Qty: 9 EACH | Refills: 0 | Status: SHIPPED | OUTPATIENT
Start: 2022-06-09 | End: 2022-06-14 | Stop reason: SDUPTHER

## 2022-06-14 DIAGNOSIS — E11.9 TYPE 2 DIABETES MELLITUS WITHOUT COMPLICATION, WITH LONG-TERM CURRENT USE OF INSULIN (HCC): ICD-10-CM

## 2022-06-14 DIAGNOSIS — Z79.4 TYPE 2 DIABETES MELLITUS WITHOUT COMPLICATION, WITH LONG-TERM CURRENT USE OF INSULIN (HCC): ICD-10-CM

## 2022-06-14 RX ORDER — BLOOD-GLUCOSE TRANSMITTER
EACH MISCELLANEOUS
Qty: 1 EACH | Refills: 2 | Status: SHIPPED | OUTPATIENT
Start: 2022-06-14 | End: 2022-06-15 | Stop reason: SDUPTHER

## 2022-06-14 RX ORDER — BLOOD-GLUCOSE SENSOR
EACH MISCELLANEOUS
Qty: 9 EACH | Refills: 2 | Status: SHIPPED | OUTPATIENT
Start: 2022-06-14 | End: 2022-06-15

## 2022-06-15 RX ORDER — BLOOD-GLUCOSE SENSOR
EACH MISCELLANEOUS
Qty: 9 EACH | Refills: 2 | Status: SHIPPED | OUTPATIENT
Start: 2022-06-15

## 2022-06-15 RX ORDER — BLOOD-GLUCOSE TRANSMITTER
EACH MISCELLANEOUS
Qty: 1 EACH | Refills: 2 | Status: SHIPPED | OUTPATIENT
Start: 2022-06-15

## 2022-06-20 LAB
LEFT EYE DIABETIC RETINOPATHY: NORMAL
RIGHT EYE DIABETIC RETINOPATHY: NORMAL

## 2022-07-25 ENCOUNTER — OFFICE VISIT (OUTPATIENT)
Dept: SLEEP CENTER | Facility: CLINIC | Age: 57
End: 2022-07-25
Payer: COMMERCIAL

## 2022-07-25 VITALS
OXYGEN SATURATION: 95 % | DIASTOLIC BLOOD PRESSURE: 78 MMHG | SYSTOLIC BLOOD PRESSURE: 122 MMHG | BODY MASS INDEX: 27.9 KG/M2 | HEART RATE: 86 BPM | HEIGHT: 72 IN | WEIGHT: 206 LBS

## 2022-07-25 DIAGNOSIS — I10 ESSENTIAL HYPERTENSION: ICD-10-CM

## 2022-07-25 DIAGNOSIS — E66.3 OVERWEIGHT (BMI 25.0-29.9): ICD-10-CM

## 2022-07-25 DIAGNOSIS — Z91.09 ENVIRONMENTAL ALLERGIES: ICD-10-CM

## 2022-07-25 DIAGNOSIS — G47.33 OBSTRUCTIVE SLEEP APNEA: Primary | ICD-10-CM

## 2022-07-25 DIAGNOSIS — J35.1 TONSILLAR HYPERTROPHY: ICD-10-CM

## 2022-07-25 DIAGNOSIS — E11.9 TYPE 2 DIABETES MELLITUS WITHOUT COMPLICATION, WITHOUT LONG-TERM CURRENT USE OF INSULIN (HCC): ICD-10-CM

## 2022-07-25 DIAGNOSIS — R40.0 DAYTIME SLEEPINESS: ICD-10-CM

## 2022-07-25 DIAGNOSIS — G47.34 IDIOPATHIC SLEEP RELATED NONOBSTRUCTIVE ALVEOLAR HYPOVENTILATION: ICD-10-CM

## 2022-07-25 PROCEDURE — 99214 OFFICE O/P EST MOD 30 MIN: CPT | Performed by: INTERNAL MEDICINE

## 2022-07-25 NOTE — PROGRESS NOTES
Follow-Up Note - Sleep Center   [facility]  60 y o  male  :1965  VBI:[id number]  DOS:2022    CC: I saw this patient for follow-up in clinic today for Sleep disordered breathing, Coexisting Sleep and Medical Problems  Home sleep testing in May of 2022 demonstrated : NENA (respiratory event index of) 8 5 /hour  Minimum oxygen saturation 74 %  and 9 3% of total sleep time was spent with saturations less than 90%  The snore index was 16 6%  PFSH, Problem List, Medications & Allergies were reviewed in EMR  Interval changes: none reported  He  has a past medical history of Diabetes mellitus ([address] ) and Hyperlipidemia  He has a current medication list which includes the following prescription(s): atorvastatin, bd pen needle rory u/f, dexcom g6 sensor, dexcom g6 transmitter, insulin aspart, lantus solostar, losartan, multivitamin-iron-minerals-folic acid, jardiance, insulin glargine, insulin glargine, and insulin glargine-yfgn  PHYSIOLOGICAL DATA REVIEW AND INTERPRETATION:    using PAP > 4 hours/night 81 6%  Estimated NENA 0 4/hour with pressure of 7 4cm H2O @90th/95th percentile; Patient has not been using ozone based devices to sanitize the machine  SUBJECTIVE: Regarding use of PAP, Chanda reports:   · minimal adverse effects: nasal congestion; has not noticed any fibres or foreign material in air line  · He is benefiting from use: sleeping better   Sleep Routine: Clint Trotter reports getting 8 hrs sleep  ; he has no difficulty initiating or maintaining sleep   He arises spontaneously and feels more refreshed since on Rx  Clint Trotter reports significantly improved  Excessive Daytime Sleepiness,   He rated himself at Total score: 6 /24 on the [place] Sleepiness Scale  Habits: reports that he has never smoked  He has never used smokeless tobacco ,  reports current alcohol use of about 1 0 standard drink of alcohol per week  ,  reports no history of drug use , Caffeine use:limited ; Exercise routine: regular    ROS: reviewed & as attached  Significant for few lb weight gain since his last visit  He has some nasal symptoms due to environmental allergies  He reported no respiratory or cardiac symptoms  EXAM: /78 (BP Location: Left arm, Patient Position: Sitting, Cuff Size: Adult)   Pulse 86   Ht 6' (1 829 m)   Wt 93 4 kg (206 lb)   SpO2 95%   BMI 27 94 kg/m²     Wt Readings from Last 3 Encounters:   07/25/22 93 4 kg (206 lb)   05/17/22 92 1 kg (203 lb)   05/02/22 92 1 kg (203 lb)      Patient is well groomed; well appearing  CNS: Alert, orientated, clear & coherent speech  Psych: cooperativeand in no distress  Mental state:appears normal   H&N: EOMI; NC/AT:no facial pressure marks, no rashes  Skin/Extrem: col & hydration normal; no edema  Resp: Respiratory effort is normal  Physical findings otherwise essentially unchanged from previous  IMPRESSION: Problem List Items & Comorbidities Addressed this Visit    1  Obstructive sleep apnea  PAP DME Resupply/Reorder   2  Idiopathic sleep related nonobstructive alveolar hypoventilation     3  Daytime sleepiness     4  Environmental allergies     5  Tonsillar hypertrophy     6  Essential hypertension     7  Type 2 diabetes mellitus without complication, without long-term current use of insulin ([facility] [address] )     8  Overweight (BMI 25 0-29 9)     1-3 improved    PLAN:  1  I reviewed results of prior studies and physiologic data with the patient  2  I discussed treatment options with risks and benefits  3  Treatment with  PAP is medically necessary and Chanda is agreable to continue use  4  Care of equipment, methods to improve comfort using PAP and importance of compliance with therapy were discussed  5  Pressure setting: change 6-10 cmH2O     6  Rx provided to replace  supplies and Care coordinated with DME provider     7  Nasal symptoms may improve with regular nasal saline rinse up to twice a day, followed by topical nasal steroid (e g  OTC Flonase, Nasacort) once a day if necessary  8  Discussed strategies for weight reduction  9  Follow-up is advised in 1 year or sooner if needed to monitor progress, compliance and to adjust therapy  Thank you for allowing me to participate in the care of this patient  Sincerely,    Authenticated electronically by Maria D Sesay MD on 36/85/56   Board Certified Specialist     Portions of the record may have been created with voice recognition software  Occasional wrong word or "sound a like" substitutions may have occurred due to the inherent limitations of voice recognition software  There may also be notations and random deletions of words or characters from malfunctioning software  Read the chart carefully and recognize, using context, where substitutions/deletions have occurred

## 2022-07-25 NOTE — PATIENT INSTRUCTIONS

## 2022-07-26 ENCOUNTER — TELEPHONE (OUTPATIENT)
Dept: SLEEP CENTER | Facility: CLINIC | Age: 57
End: 2022-07-26

## 2022-08-04 ENCOUNTER — IMMUNIZATIONS (OUTPATIENT)
Dept: FAMILY MEDICINE CLINIC | Facility: CLINIC | Age: 57
End: 2022-08-04
Payer: COMMERCIAL

## 2022-08-04 DIAGNOSIS — Z23 COVID-19 VACCINE ADMINISTERED: Primary | ICD-10-CM

## 2022-08-04 PROCEDURE — 0054A PR IMM ADMN SARSCOV2 30MCG/0.3ML TRIS-SUCROSE BST: CPT

## 2022-08-04 PROCEDURE — 91305 PR SARSCOV2 VACCINE 30MCG/0.3ML TRIS-SUCROSE IM USE: CPT

## 2022-08-29 ENCOUNTER — TELEPHONE (OUTPATIENT)
Dept: NEPHROLOGY | Facility: CLINIC | Age: 57
End: 2022-08-29

## 2022-08-29 NOTE — TELEPHONE ENCOUNTER
Pt canceled consult via [place] 10/10/2022 @10:00 with Brittnee Crum in the EO  Lm to see it pt wanted to reschedule

## 2022-10-31 ENCOUNTER — TELEPHONE (OUTPATIENT)
Dept: INTERNAL MEDICINE CLINIC | Facility: CLINIC | Age: 57
End: 2022-10-31

## 2022-10-31 DIAGNOSIS — E11.9 DIABETES (HCC): Primary | ICD-10-CM

## 2022-10-31 DIAGNOSIS — E78.5 HYPERLIPIDEMIA: ICD-10-CM

## 2022-10-31 NOTE — TELEPHONE ENCOUNTER
----- Message from [facility] sent at 10/31/2022  3:12 PM EDT -----  Regarding: Blood work   Hi Dr Aleta Lyons,  I am seeing you on Nov 7  Would like to do my blood work prior    Could you please order   BMP,  CBC ( had polycythemia previously)  HbA1c  Lipids   Urine microalbumin/ creatinine     Thank you   James Mccoy

## 2022-11-05 ENCOUNTER — APPOINTMENT (OUTPATIENT)
Dept: LAB | Facility: HOSPITAL | Age: 57
End: 2022-11-05

## 2022-11-05 DIAGNOSIS — E11.9 DIABETES (HCC): ICD-10-CM

## 2022-11-05 DIAGNOSIS — E78.5 HYPERLIPIDEMIA: ICD-10-CM

## 2022-11-05 LAB
ANION GAP SERPL CALCULATED.3IONS-SCNC: 9 MMOL/L (ref 4–13)
BASOPHILS # BLD AUTO: 0.04 THOUSANDS/ÂΜL (ref 0–0.1)
BASOPHILS NFR BLD AUTO: 1 % (ref 0–1)
BUN SERPL-MCNC: 14 MG/DL (ref 5–25)
CALCIUM SERPL-MCNC: 8.5 MG/DL (ref 8.3–10.1)
CHLORIDE SERPL-SCNC: 106 MMOL/L (ref 96–108)
CHOLEST SERPL-MCNC: 133 MG/DL
CO2 SERPL-SCNC: 28 MMOL/L (ref 21–32)
CREAT SERPL-MCNC: 1.25 MG/DL (ref 0.6–1.3)
CREAT UR-MCNC: 64.8 MG/DL
EOSINOPHIL # BLD AUTO: 0.62 THOUSAND/ÂΜL (ref 0–0.61)
EOSINOPHIL NFR BLD AUTO: 7 % (ref 0–6)
ERYTHROCYTE [DISTWIDTH] IN BLOOD BY AUTOMATED COUNT: 12.6 % (ref 11.6–15.1)
EST. AVERAGE GLUCOSE BLD GHB EST-MCNC: 166 MG/DL
GFR SERPL CREATININE-BSD FRML MDRD: 62 ML/MIN/1.73SQ M
GLUCOSE P FAST SERPL-MCNC: 191 MG/DL (ref 65–99)
HBA1C MFR BLD: 7.4 %
HCT VFR BLD AUTO: 45.8 % (ref 36.5–49.3)
HDLC SERPL-MCNC: 33 MG/DL
HGB BLD-MCNC: 14.9 G/DL (ref 12–17)
IMM GRANULOCYTES # BLD AUTO: 0.04 THOUSAND/UL (ref 0–0.2)
IMM GRANULOCYTES NFR BLD AUTO: 1 % (ref 0–2)
IRON SATN MFR SERPL: 22 % (ref 20–50)
IRON SERPL-MCNC: 82 UG/DL (ref 65–175)
LDLC SERPL CALC-MCNC: 62 MG/DL (ref 0–100)
LYMPHOCYTES # BLD AUTO: 2.65 THOUSANDS/ÂΜL (ref 0.6–4.47)
LYMPHOCYTES NFR BLD AUTO: 31 % (ref 14–44)
MCH RBC QN AUTO: 27.7 PG (ref 26.8–34.3)
MCHC RBC AUTO-ENTMCNC: 32.5 G/DL (ref 31.4–37.4)
MCV RBC AUTO: 85 FL (ref 82–98)
MICROALBUMIN UR-MCNC: 13.7 MG/L (ref 0–20)
MICROALBUMIN/CREAT 24H UR: 21 MG/G CREATININE (ref 0–30)
MONOCYTES # BLD AUTO: 0.53 THOUSAND/ÂΜL (ref 0.17–1.22)
MONOCYTES NFR BLD AUTO: 6 % (ref 4–12)
NEUTROPHILS # BLD AUTO: 4.63 THOUSANDS/ÂΜL (ref 1.85–7.62)
NEUTS SEG NFR BLD AUTO: 54 % (ref 43–75)
NONHDLC SERPL-MCNC: 100 MG/DL
NRBC BLD AUTO-RTO: 0 /100 WBCS
PLATELET # BLD AUTO: 267 THOUSANDS/UL (ref 149–390)
PMV BLD AUTO: 9.3 FL (ref 8.9–12.7)
POTASSIUM SERPL-SCNC: 4 MMOL/L (ref 3.5–5.3)
RBC # BLD AUTO: 5.38 MILLION/UL (ref 3.88–5.62)
RETICS # AUTO: NORMAL 10*3/UL (ref 14356–105094)
RETICS # CALC: 1.61 % (ref 0.37–1.87)
SODIUM SERPL-SCNC: 143 MMOL/L (ref 135–147)
TIBC SERPL-MCNC: 370 UG/DL (ref 250–450)
TRIGL SERPL-MCNC: 188 MG/DL
WBC # BLD AUTO: 8.51 THOUSAND/UL (ref 4.31–10.16)

## 2022-11-21 DIAGNOSIS — I10 ESSENTIAL HYPERTENSION: ICD-10-CM

## 2022-11-21 DIAGNOSIS — E78.5 HYPERLIPIDEMIA, UNSPECIFIED HYPERLIPIDEMIA TYPE: ICD-10-CM

## 2022-11-21 DIAGNOSIS — E11.9 TYPE 2 DIABETES MELLITUS WITHOUT COMPLICATION, WITH LONG-TERM CURRENT USE OF INSULIN (HCC): ICD-10-CM

## 2022-11-21 DIAGNOSIS — Z79.4 TYPE 2 DIABETES MELLITUS WITHOUT COMPLICATION, WITH LONG-TERM CURRENT USE OF INSULIN (HCC): ICD-10-CM

## 2022-11-21 RX ORDER — ATORVASTATIN CALCIUM 20 MG/1
20 TABLET, FILM COATED ORAL
Qty: 90 TABLET | Refills: 0 | Status: SHIPPED | OUTPATIENT
Start: 2022-11-21

## 2022-11-21 RX ORDER — LOSARTAN POTASSIUM 25 MG/1
50 TABLET ORAL DAILY
Qty: 180 TABLET | Refills: 0 | Status: SHIPPED | OUTPATIENT
Start: 2022-11-21

## 2022-11-21 RX ORDER — PEN NEEDLE, DIABETIC 32GX 5/32"
NEEDLE, DISPOSABLE MISCELLANEOUS 2 TIMES DAILY
Qty: 200 EACH | Refills: 0 | Status: SHIPPED | OUTPATIENT
Start: 2022-11-21

## 2023-02-27 DIAGNOSIS — Z79.4 TYPE 2 DIABETES MELLITUS WITHOUT COMPLICATION, WITH LONG-TERM CURRENT USE OF INSULIN (HCC): ICD-10-CM

## 2023-02-27 DIAGNOSIS — E11.9 TYPE 2 DIABETES MELLITUS WITHOUT COMPLICATION, WITH LONG-TERM CURRENT USE OF INSULIN (HCC): ICD-10-CM

## 2023-02-27 RX ORDER — BLOOD-GLUCOSE SENSOR
EACH MISCELLANEOUS
Qty: 9 EACH | Refills: 0 | Status: SHIPPED | OUTPATIENT
Start: 2023-02-27

## 2023-02-28 DIAGNOSIS — E78.5 HYPERLIPIDEMIA, UNSPECIFIED HYPERLIPIDEMIA TYPE: ICD-10-CM

## 2023-02-28 DIAGNOSIS — I10 ESSENTIAL HYPERTENSION: ICD-10-CM

## 2023-02-28 DIAGNOSIS — Z79.4 TYPE 2 DIABETES MELLITUS WITHOUT COMPLICATION, WITH LONG-TERM CURRENT USE OF INSULIN (HCC): ICD-10-CM

## 2023-02-28 DIAGNOSIS — E11.9 TYPE 2 DIABETES MELLITUS WITHOUT COMPLICATION, WITH LONG-TERM CURRENT USE OF INSULIN (HCC): ICD-10-CM

## 2023-02-28 RX ORDER — BLOOD-GLUCOSE TRANSMITTER
EACH MISCELLANEOUS
Qty: 1 EACH | Refills: 2 | Status: SHIPPED | OUTPATIENT
Start: 2023-02-28

## 2023-02-28 RX ORDER — LOSARTAN POTASSIUM 25 MG/1
50 TABLET ORAL DAILY
Qty: 180 TABLET | Refills: 0 | Status: SHIPPED | OUTPATIENT
Start: 2023-02-28

## 2023-02-28 RX ORDER — ATORVASTATIN CALCIUM 20 MG/1
20 TABLET, FILM COATED ORAL
Qty: 90 TABLET | Refills: 0 | Status: SHIPPED | OUTPATIENT
Start: 2023-02-28

## 2023-02-28 NOTE — TELEPHONE ENCOUNTER
Pt will need bloodwork for upcoming appt, he is requesting a1c, bmp and lipid panel      Next appt 3/13/22, refills needed

## 2023-03-10 ENCOUNTER — TELEPHONE (OUTPATIENT)
Dept: INTERNAL MEDICINE CLINIC | Facility: CLINIC | Age: 58
End: 2023-03-10

## 2023-03-10 ENCOUNTER — APPOINTMENT (OUTPATIENT)
Dept: LAB | Facility: CLINIC | Age: 58
End: 2023-03-10

## 2023-03-10 DIAGNOSIS — E13.9 DIABETES 1.5, MANAGED AS TYPE 1 (HCC): ICD-10-CM

## 2023-03-10 DIAGNOSIS — N18.9 CHRONIC KIDNEY DISEASE, UNSPECIFIED CKD STAGE: ICD-10-CM

## 2023-03-10 DIAGNOSIS — E66.9 OBESITY, UNSPECIFIED CLASSIFICATION, UNSPECIFIED OBESITY TYPE, UNSPECIFIED WHETHER SERIOUS COMORBIDITY PRESENT: ICD-10-CM

## 2023-03-10 DIAGNOSIS — R60.9 LIPEDEMA: ICD-10-CM

## 2023-03-10 LAB
ANION GAP SERPL CALCULATED.3IONS-SCNC: 6 MMOL/L (ref 4–13)
BASOPHILS # BLD AUTO: 0.04 THOUSANDS/ÂΜL (ref 0–0.1)
BASOPHILS NFR BLD AUTO: 1 % (ref 0–1)
BUN SERPL-MCNC: 18 MG/DL (ref 5–25)
CALCIUM SERPL-MCNC: 9.5 MG/DL (ref 8.4–10.2)
CHLORIDE SERPL-SCNC: 105 MMOL/L (ref 96–108)
CHOLEST SERPL-MCNC: 136 MG/DL
CO2 SERPL-SCNC: 27 MMOL/L (ref 21–32)
CREAT SERPL-MCNC: 1.25 MG/DL (ref 0.6–1.3)
EOSINOPHIL # BLD AUTO: 0.63 THOUSAND/ÂΜL (ref 0–0.61)
EOSINOPHIL NFR BLD AUTO: 7 % (ref 0–6)
ERYTHROCYTE [DISTWIDTH] IN BLOOD BY AUTOMATED COUNT: 13 % (ref 11.6–15.1)
GFR SERPL CREATININE-BSD FRML MDRD: 62 ML/MIN/1.73SQ M
GLUCOSE P FAST SERPL-MCNC: 170 MG/DL (ref 65–99)
HCT VFR BLD AUTO: 53.2 % (ref 36.5–49.3)
HDLC SERPL-MCNC: 31 MG/DL
HGB BLD-MCNC: 17.1 G/DL (ref 12–17)
IMM GRANULOCYTES # BLD AUTO: 0.05 THOUSAND/UL (ref 0–0.2)
IMM GRANULOCYTES NFR BLD AUTO: 1 % (ref 0–2)
LYMPHOCYTES # BLD AUTO: 2.68 THOUSANDS/ÂΜL (ref 0.6–4.47)
LYMPHOCYTES NFR BLD AUTO: 31 % (ref 14–44)
MCH RBC QN AUTO: 28.1 PG (ref 26.8–34.3)
MCHC RBC AUTO-ENTMCNC: 32.1 G/DL (ref 31.4–37.4)
MCV RBC AUTO: 87 FL (ref 82–98)
MONOCYTES # BLD AUTO: 0.52 THOUSAND/ÂΜL (ref 0.17–1.22)
MONOCYTES NFR BLD AUTO: 6 % (ref 4–12)
NEUTROPHILS # BLD AUTO: 4.63 THOUSANDS/ÂΜL (ref 1.85–7.62)
NEUTS SEG NFR BLD AUTO: 54 % (ref 43–75)
NONHDLC SERPL-MCNC: 105 MG/DL
NRBC BLD AUTO-RTO: 0 /100 WBCS
PLATELET # BLD AUTO: 267 THOUSANDS/UL (ref 149–390)
PMV BLD AUTO: 9.4 FL (ref 8.9–12.7)
POTASSIUM SERPL-SCNC: 4.3 MMOL/L (ref 3.5–5.3)
RBC # BLD AUTO: 6.09 MILLION/UL (ref 3.88–5.62)
SODIUM SERPL-SCNC: 138 MMOL/L (ref 135–147)
TRIGL SERPL-MCNC: 458 MG/DL
TSH SERPL DL<=0.05 MIU/L-ACNC: 1.27 UIU/ML (ref 0.45–4.5)
WBC # BLD AUTO: 8.55 THOUSAND/UL (ref 4.31–10.16)

## 2023-03-10 NOTE — TELEPHONE ENCOUNTER
Dr Ericka Peterson ask Isaias Wesley at [facility] out patient lab to add blood work to this patients chart

## 2023-03-11 LAB
EST. AVERAGE GLUCOSE BLD GHB EST-MCNC: 171 MG/DL
HBA1C MFR BLD: 7.6 %

## 2023-03-13 ENCOUNTER — OFFICE VISIT (OUTPATIENT)
Dept: INTERNAL MEDICINE CLINIC | Facility: CLINIC | Age: 58
End: 2023-03-13

## 2023-03-13 VITALS
WEIGHT: 202 LBS | OXYGEN SATURATION: 96 % | BODY MASS INDEX: 27.36 KG/M2 | TEMPERATURE: 97.9 F | DIASTOLIC BLOOD PRESSURE: 70 MMHG | HEART RATE: 110 BPM | HEIGHT: 72 IN | SYSTOLIC BLOOD PRESSURE: 140 MMHG

## 2023-03-13 DIAGNOSIS — E11.9 DIABETES (HCC): ICD-10-CM

## 2023-03-13 DIAGNOSIS — E78.5 HYPERLIPIDEMIA: ICD-10-CM

## 2023-03-13 DIAGNOSIS — E78.1 HYPERTRIGLYCERIDEMIA: Primary | ICD-10-CM

## 2023-03-13 RX ORDER — ICOSAPENT ETHYL 1000 MG/1
1 CAPSULE ORAL 2 TIMES DAILY
Qty: 60 CAPSULE | Refills: 3 | Status: SHIPPED | OUTPATIENT
Start: 2023-03-13 | End: 2023-06-11

## 2023-03-13 RX ORDER — INSULIN GLARGINE-YFGN 100 [IU]/ML
INJECTION, SOLUTION SUBCUTANEOUS
COMMUNITY
Start: 2023-02-27

## 2023-03-13 NOTE — PROGRESS NOTES
Assessment/Plan:    Follow  Up   Diabetes     Diagnoses and all orders for this visit:    Hypertriglyceridemia  -     Icosapent Ethyl (Vascepa) 1 g CAPS; Take 1 capsule (1 g total) by mouth 2 (two) times a day  -     Lipid panel; Future    Other orders  -     Semglee, yfgn, 100 UNIT/ML SOPN          Subjective:      Patient ID: Aime Arenas is a 59 y o  male  HPI    The following portions of the patient's history were reviewed and updated as appropriate: allergies, current medications, past family history, past medical history, past social history, past surgical history, and problem list     Review of Systems   Constitutional: Negative  HENT: Negative for dental problem, drooling, ear discharge and ear pain  Eyes: Negative for discharge, redness and itching  Respiratory: Negative for apnea, cough and wheezing  Cardiovascular: Negative for chest pain and palpitations  Gastrointestinal: Negative for abdominal pain, blood in stool, diarrhea and vomiting  Endocrine: Negative for polydipsia, polyphagia and polyuria  Genitourinary: Negative for decreased urine volume, dysuria and frequency  Musculoskeletal: Negative for arthralgias, myalgias and neck stiffness  Skin: Negative for pallor and wound  Allergic/Immunologic: Negative for environmental allergies and food allergies  Neurological: Negative for facial asymmetry, light-headedness, numbness and headaches  Hematological: Negative for adenopathy  Does not bruise/bleed easily  Psychiatric/Behavioral: Negative for agitation, behavioral problems and confusion  Objective:      /70 (BP Location: Left arm, Patient Position: Sitting, Cuff Size: Large)   Pulse (!) 110   Temp 97 9 °F (36 6 °C) (Temporal)   Ht 6' (1 829 m)   Wt 91 6 kg (202 lb)   SpO2 96% Comment: RA  BMI 27 40 kg/m²          Physical Exam  Constitutional:       Appearance: Normal appearance  He is obese  HENT:      Head: Normocephalic        Nose: Nose normal       Mouth/Throat:      Mouth: Mucous membranes are moist    Eyes:      Pupils: Pupils are equal, round, and reactive to light  Cardiovascular:      Rate and Rhythm: Regular rhythm  Heart sounds: Normal heart sounds  Pulmonary:      Breath sounds: Normal breath sounds  Abdominal:      Palpations: Abdomen is soft  Musculoskeletal:         General: No swelling  Cervical back: Neck supple  Skin:     General: Skin is warm  Neurological:      General: No focal deficit present  Mental Status: He is alert and oriented to person, place, and time  Psychiatric:         Mood and Affect: Mood normal        Hyperlipidemia      Triglycerides   High  Will  Start  Vascepa  1g  cheryl   To  Lower  The  Triglycerides   Continue  Atorvastatin  Same dose    Diabetes    Hemoglobin  A1c   7 3         Will  Check   Lipid  Panel  In  4  Weeks        Fup  6 months      F Sacha ALEXANDER,FACP

## 2023-04-28 DIAGNOSIS — I10 ESSENTIAL HYPERTENSION: Primary | ICD-10-CM

## 2023-04-28 DIAGNOSIS — Z79.4 TYPE 2 DIABETES MELLITUS WITHOUT COMPLICATION, WITH LONG-TERM CURRENT USE OF INSULIN (HCC): ICD-10-CM

## 2023-04-28 DIAGNOSIS — E78.5 HYPERLIPIDEMIA, UNSPECIFIED HYPERLIPIDEMIA TYPE: ICD-10-CM

## 2023-04-28 DIAGNOSIS — E11.9 TYPE 2 DIABETES MELLITUS WITHOUT COMPLICATION, WITH LONG-TERM CURRENT USE OF INSULIN (HCC): ICD-10-CM

## 2023-04-28 RX ORDER — INSULIN ASPART 100 [IU]/ML
35 INJECTION, SOLUTION INTRAVENOUS; SUBCUTANEOUS
Qty: 94.5 ML | Refills: 3 | Status: SHIPPED | OUTPATIENT
Start: 2023-04-28 | End: 2023-07-27

## 2023-04-28 RX ORDER — TELMISARTAN 20 MG/1
20 TABLET ORAL DAILY
Qty: 30 TABLET | Refills: 1 | Status: SHIPPED | OUTPATIENT
Start: 2023-04-28

## 2023-04-28 RX ORDER — PROCHLORPERAZINE 25 MG/1
SUPPOSITORY RECTAL
Qty: 9 EACH | Refills: 0 | Status: SHIPPED | OUTPATIENT
Start: 2023-04-28

## 2023-04-28 RX ORDER — ATORVASTATIN CALCIUM 20 MG/1
20 TABLET, FILM COATED ORAL
Qty: 90 TABLET | Refills: 0 | Status: SHIPPED | OUTPATIENT
Start: 2023-04-28

## 2023-04-28 RX ORDER — PROCHLORPERAZINE 25 MG/1
SUPPOSITORY RECTAL
Qty: 1 EACH | Refills: 2 | Status: SHIPPED | OUTPATIENT
Start: 2023-04-28

## 2023-06-12 ENCOUNTER — TELEPHONE (OUTPATIENT)
Dept: PULMONOLOGY | Facility: CLINIC | Age: 58
End: 2023-06-12

## 2023-06-12 NOTE — TELEPHONE ENCOUNTER
LM for patient to give a call back to set up a consult with any doctor best location for patient please attached to Referral

## 2023-06-26 LAB
LEFT EYE DIABETIC RETINOPATHY: NORMAL
RIGHT EYE DIABETIC RETINOPATHY: NORMAL

## 2023-07-06 DIAGNOSIS — I10 ESSENTIAL HYPERTENSION: ICD-10-CM

## 2023-07-06 DIAGNOSIS — E78.5 HYPERLIPIDEMIA, UNSPECIFIED HYPERLIPIDEMIA TYPE: ICD-10-CM

## 2023-07-06 DIAGNOSIS — Z79.4 TYPE 2 DIABETES MELLITUS WITHOUT COMPLICATION, WITH LONG-TERM CURRENT USE OF INSULIN (HCC): Primary | ICD-10-CM

## 2023-07-06 DIAGNOSIS — E11.9 TYPE 2 DIABETES MELLITUS WITHOUT COMPLICATION, WITH LONG-TERM CURRENT USE OF INSULIN (HCC): Primary | ICD-10-CM

## 2023-07-06 RX ORDER — TELMISARTAN 20 MG/1
20 TABLET ORAL DAILY
Qty: 30 TABLET | Refills: 0 | Status: SHIPPED | OUTPATIENT
Start: 2023-07-06

## 2023-07-06 RX ORDER — INSULIN ASPART 100 [IU]/ML
35 INJECTION, SOLUTION INTRAVENOUS; SUBCUTANEOUS
Qty: 94.5 ML | Refills: 0 | Status: SHIPPED | OUTPATIENT
Start: 2023-07-06

## 2023-07-06 RX ORDER — ATORVASTATIN CALCIUM 20 MG/1
20 TABLET, FILM COATED ORAL
Qty: 90 TABLET | Refills: 0 | Status: SHIPPED | OUTPATIENT
Start: 2023-07-06

## 2023-07-06 RX ORDER — PROCHLORPERAZINE 25 MG/1
SUPPOSITORY RECTAL
Qty: 1 EACH | Refills: 0 | Status: SHIPPED | OUTPATIENT
Start: 2023-07-06

## 2023-07-06 RX ORDER — PROCHLORPERAZINE 25 MG/1
SUPPOSITORY RECTAL
Qty: 9 EACH | Refills: 0 | Status: SHIPPED | OUTPATIENT
Start: 2023-07-06

## 2023-08-04 ENCOUNTER — OFFICE VISIT (OUTPATIENT)
Dept: DERMATOLOGY | Facility: CLINIC | Age: 58
End: 2023-08-04

## 2023-08-04 DIAGNOSIS — L91.8 ACHROCHORDON: Primary | ICD-10-CM

## 2023-08-04 PROCEDURE — TRAINING PR TRAINING: Performed by: DERMATOLOGY

## 2023-08-04 NOTE — PROGRESS NOTES
RESIDENT TRAINING DO NOT BILL       JANELLEON ("SKIN TAG")    Physical Exam:  • Anatomic Location Affected:  Posterior neck, right cheek, right eyelid  • Morphological Description:  Skin colored pedunculated papules   • Pertinent Positives:  • Pertinent Negatives: Additional History of Present Condition:  Present for a while, cosmetically bothersome to the patient's wife. Assessment and Plan:  Based on a thorough discussion of this condition and the management approach to it (including a comprehensive discussion of the known risks, side effects and potential benefits of treatment), the patient (family) agrees to implement the following specific plan:  • Snip removal today - demonstrated with medical students present for learning purposes. PROCEDURE:  DESTRUCTION OF BENIGN LESIONS WITH SNIP REMOVAL    After a thorough discussion of treatment options and risk/benefits/alternatives (including but not limited to local pain, scarring, dyspigmentation, blistering, and possible superinfection), verbal and written consent were obtained and the aforementioned lesions were treated on with cryotherapy using liquid nitrogen x 1 cycle for 5-10 seconds. TOTAL NUMBER of 3 lesions were treated today on the ANATOMIC LOCATION: Posterior neck, right cheek, right eyelid. The patient tolerated the procedure well, and after-care instructions were provided. Skin tags are common, soft, harmless skin lesions that are also called, in the appropriate settings, papillomas, fibroepithelial polyps, and soft fibromas. They are made up of loosely arranged collagen fibers and blood vessels surrounded by a thickened or thinned-out epidermis. Skin tags tend to develop in both men and women as we grow older. They are usually found on the skin folds (neck, armpits, groin). It is not known what specifically causes skin tags.   Certain factors, though, do appear to play a role:  • Chaffing and irritation from skin rubbing together  • High levels of growth factors (as seen, for example, in pregnancy or in acromegaly/gigantism)  • Insulin resistance  • Human papillomavirus (wart virus)    We discussed that most skin tags do not need to be treated unless they are specifically causing the patient physical distress or limitation or pose a risk for a larger problem such as an infection that forms secondary to excoriation or chronic irritation.     We had a thorough discussion of treatment options and specific risks (including that any procedural treatment may not be covered by insurance and would then be the patient's responsibility) and benefits/alternatives including but not limited to the following:  • Cryotherapy (freezing)  • Shave removal  • Surgical excision (snip excision with scissors)  • Electrosurgery  • Ligation (we do not do this procedure and counseled against it due to risk of tissue necrosis and infection)        Felicia Quiroga MD  Dermatology, PGY-3

## 2023-08-15 DIAGNOSIS — I10 ESSENTIAL HYPERTENSION: ICD-10-CM

## 2023-08-15 RX ORDER — TELMISARTAN 20 MG/1
20 TABLET ORAL DAILY
Qty: 30 TABLET | Refills: 0 | Status: SHIPPED | OUTPATIENT
Start: 2023-08-15

## 2023-09-14 DIAGNOSIS — E11.9 TYPE 2 DIABETES MELLITUS WITHOUT COMPLICATION, WITH LONG-TERM CURRENT USE OF INSULIN (HCC): ICD-10-CM

## 2023-09-14 DIAGNOSIS — Z79.4 TYPE 2 DIABETES MELLITUS WITHOUT COMPLICATION, WITH LONG-TERM CURRENT USE OF INSULIN (HCC): ICD-10-CM

## 2023-09-14 DIAGNOSIS — E78.1 HYPERTRIGLYCERIDEMIA: ICD-10-CM

## 2023-09-14 DIAGNOSIS — I10 ESSENTIAL HYPERTENSION: ICD-10-CM

## 2023-09-14 RX ORDER — TELMISARTAN 20 MG/1
20 TABLET ORAL DAILY
Qty: 30 TABLET | Refills: 0 | Status: SHIPPED | OUTPATIENT
Start: 2023-09-14

## 2023-09-14 RX ORDER — ICOSAPENT ETHYL 1000 MG/1
1 CAPSULE ORAL 2 TIMES DAILY
Qty: 60 CAPSULE | Refills: 0 | Status: SHIPPED | OUTPATIENT
Start: 2023-09-14 | End: 2023-12-13

## 2023-09-14 RX ORDER — PROCHLORPERAZINE 25 MG/1
SUPPOSITORY RECTAL
Qty: 1 EACH | Refills: 0 | Status: SHIPPED | OUTPATIENT
Start: 2023-09-14

## 2023-09-14 RX ORDER — PROCHLORPERAZINE 25 MG/1
SUPPOSITORY RECTAL
Qty: 9 EACH | Refills: 0 | Status: SHIPPED | OUTPATIENT
Start: 2023-09-14

## 2023-10-20 DIAGNOSIS — I10 ESSENTIAL HYPERTENSION: ICD-10-CM

## 2023-10-20 DIAGNOSIS — E11.9 TYPE 2 DIABETES MELLITUS WITHOUT COMPLICATION, WITH LONG-TERM CURRENT USE OF INSULIN (HCC): ICD-10-CM

## 2023-10-20 DIAGNOSIS — Z79.4 TYPE 2 DIABETES MELLITUS WITHOUT COMPLICATION, WITH LONG-TERM CURRENT USE OF INSULIN (HCC): ICD-10-CM

## 2023-10-20 RX ORDER — INSULIN ASPART 100 [IU]/ML
35 INJECTION, SOLUTION INTRAVENOUS; SUBCUTANEOUS
Qty: 94.5 ML | Refills: 0 | Status: SHIPPED | OUTPATIENT
Start: 2023-10-20

## 2023-10-20 RX ORDER — TELMISARTAN 20 MG/1
20 TABLET ORAL DAILY
Qty: 90 TABLET | Refills: 0 | Status: SHIPPED | OUTPATIENT
Start: 2023-10-20

## 2023-11-11 ENCOUNTER — APPOINTMENT (OUTPATIENT)
Dept: LAB | Facility: MEDICAL CENTER | Age: 58
End: 2023-11-11
Payer: COMMERCIAL

## 2023-11-11 DIAGNOSIS — E78.1 HYPERTRIGLYCERIDEMIA: ICD-10-CM

## 2023-11-11 LAB
CHOLEST SERPL-MCNC: 160 MG/DL
HDLC SERPL-MCNC: 39 MG/DL
LDLC SERPL CALC-MCNC: 72 MG/DL (ref 0–100)
NONHDLC SERPL-MCNC: 121 MG/DL
TRIGL SERPL-MCNC: 245 MG/DL

## 2023-11-11 PROCEDURE — 80061 LIPID PANEL: CPT

## 2023-11-11 PROCEDURE — 36415 COLL VENOUS BLD VENIPUNCTURE: CPT

## 2023-11-13 ENCOUNTER — OFFICE VISIT (OUTPATIENT)
Dept: INTERNAL MEDICINE CLINIC | Facility: CLINIC | Age: 58
End: 2023-11-13
Payer: COMMERCIAL

## 2023-11-13 VITALS
HEIGHT: 72 IN | DIASTOLIC BLOOD PRESSURE: 80 MMHG | SYSTOLIC BLOOD PRESSURE: 130 MMHG | BODY MASS INDEX: 27.71 KG/M2 | HEART RATE: 78 BPM | WEIGHT: 204.6 LBS | TEMPERATURE: 97.4 F | OXYGEN SATURATION: 97 %

## 2023-11-13 DIAGNOSIS — Z12.5 PROSTATE CANCER SCREENING: Primary | ICD-10-CM

## 2023-11-13 DIAGNOSIS — Z79.4 TYPE 2 DIABETES MELLITUS WITHOUT COMPLICATION, WITH LONG-TERM CURRENT USE OF INSULIN (HCC): ICD-10-CM

## 2023-11-13 DIAGNOSIS — E78.1 HYPERTRIGLYCERIDEMIA: ICD-10-CM

## 2023-11-13 DIAGNOSIS — E78.5 HYPERLIPIDEMIA: ICD-10-CM

## 2023-11-13 DIAGNOSIS — E11.9 DIABETES (HCC): ICD-10-CM

## 2023-11-13 DIAGNOSIS — E11.9 TYPE 2 DIABETES MELLITUS WITHOUT COMPLICATION, WITH LONG-TERM CURRENT USE OF INSULIN (HCC): ICD-10-CM

## 2023-11-13 PROCEDURE — 99213 OFFICE O/P EST LOW 20 MIN: CPT | Performed by: INTERNAL MEDICINE

## 2023-11-13 RX ORDER — ICOSAPENT ETHYL 1000 MG/1
1 CAPSULE ORAL 2 TIMES DAILY
Qty: 60 CAPSULE | Refills: 2 | Status: SHIPPED | OUTPATIENT
Start: 2023-11-13 | End: 2024-02-11

## 2023-11-13 RX ORDER — INSULIN GLARGINE-YFGN 100 [IU]/ML
40 INJECTION, SOLUTION SUBCUTANEOUS DAILY
Qty: 100 ML | Refills: 0 | Status: SHIPPED | OUTPATIENT
Start: 2023-11-13

## 2023-11-13 RX ORDER — BUDESONIDE AND FORMOTEROL FUMARATE DIHYDRATE 80; 4.5 UG/1; UG/1
2 AEROSOL RESPIRATORY (INHALATION) 2 TIMES DAILY
COMMUNITY
End: 2023-11-20 | Stop reason: SDUPTHER

## 2023-11-13 RX ORDER — ACYCLOVIR 400 MG/1
1 TABLET ORAL
COMMUNITY

## 2023-11-13 RX ORDER — INSULIN ASPART 100 [IU]/ML
40 INJECTION, SOLUTION INTRAVENOUS; SUBCUTANEOUS
Qty: 100 ML | Refills: 2 | Status: SHIPPED | OUTPATIENT
Start: 2023-11-13

## 2023-11-13 NOTE — PROGRESS NOTES
Assessment/Plan:    Follow up  Diabetes , Hyperlipidemia     Diagnoses and all orders for this visit:    Prostate cancer screening  -     PSA, Total Screen; Future    Hyperlipidemia  -     Lipid panel; Future    Diabetes (HCC)  -     Albumin / creatinine urine ratio; Future  -     Basic metabolic panel; Future    Hypertriglyceridemia  -     Icosapent Ethyl (Vascepa) 1 g CAPS; Take 1 capsule (1 g total) by mouth 2 (two) times a day    Type 2 diabetes mellitus without complication, with long-term current use of insulin (HCC)  -     insulin aspart (NovoLOG FlexPen) 100 UNIT/ML injection pen; Inject 40 Units under the skin 3 (three) times a day with meals Inject 43 units  Under the skin 3 times Daily with meals  -     Insulin Glargine-yfgn (Semglee, yfgn,) 100 UNIT/ML SOPN; Inject 0.4 mL (40 Units total) under the skin in the morning    Other orders  -     Continuous Blood Gluc Sensor (Dexcom G7 Sensor); Use 1 Device every 10 days  -     budesonide-formoterol (SYMBICORT) 80-4.5 MCG/ACT inhaler; Inhale 2 puffs 2 (two) times a day Rinse mouth after use. Subjective: o  complaints     Patient ID: Barbara Nunez is a 59 y.o. male. HPI    The following portions of the patient's history were reviewed and updated as appropriate: allergies, current medications, past family history, past medical history, past social history, past surgical history, and problem list.    Review of Systems   Constitutional: Negative. HENT:  Negative for dental problem, drooling, ear discharge and ear pain. Eyes:  Negative for discharge, redness and itching. Respiratory:  Negative for apnea, cough and wheezing. Cardiovascular:  Negative for chest pain and palpitations. Gastrointestinal:  Negative for abdominal pain, blood in stool, diarrhea and vomiting. Endocrine: Negative for polydipsia, polyphagia and polyuria. Genitourinary:  Negative for decreased urine volume, dysuria and frequency.    Musculoskeletal:  Negative for arthralgias, myalgias and neck stiffness. Skin:  Negative for pallor and wound. Allergic/Immunologic: Negative for environmental allergies and food allergies. Neurological:  Negative for facial asymmetry, light-headedness, numbness and headaches. Hematological:  Negative for adenopathy. Does not bruise/bleed easily. Psychiatric/Behavioral:  Negative for agitation, behavioral problems and confusion. Objective:      /80 (BP Location: Left arm, Patient Position: Sitting, Cuff Size: Standard)   Pulse 78   Temp (!) 97.4 °F (36.3 °C) (Temporal)   Ht 6' (1.829 m)   Wt 92.8 kg (204 lb 9.6 oz)   SpO2 97%   BMI 27.75 kg/m²          Physical Exam  Constitutional:       Appearance: Normal appearance. HENT:      Head: Normocephalic. Nose: Nose normal.      Mouth/Throat:      Mouth: Mucous membranes are moist.   Eyes:      Pupils: Pupils are equal, round, and reactive to light. Cardiovascular:      Rate and Rhythm: Regular rhythm. Heart sounds: Normal heart sounds. Pulmonary:      Breath sounds: Normal breath sounds. Abdominal:      Palpations: Abdomen is soft. Musculoskeletal:         General: No swelling. Cervical back: Neck supple. Skin:     General: Skin is warm. Neurological:      General: No focal deficit present. Mental Status: He is alert and oriented to person, place, and time. Psychiatric:         Mood and Affect: Mood normal.     Problem  #1  Diabetes    Well  controlled with  current meds   Check  Hemoglobin A1c  prior to the next  visit. Problem#2  Hyperlipidemia      Tryglicerides  058  improved  with  addition   of  VAscepa   to   statin . Repeat  Lipid Panel  next  visit    Foot  exam  OK. Fup  4 months.      Emmy Garcia MD.FACP

## 2023-11-15 ENCOUNTER — TELEPHONE (OUTPATIENT)
Dept: INTERNAL MEDICINE CLINIC | Facility: CLINIC | Age: 58
End: 2023-11-15

## 2023-11-15 NOTE — TELEPHONE ENCOUNTER
Angela Fierro from [facility] [facility] called stating that patients NovaLog has two different directions:   -Inject 40 Units under the skin 3 (three) times a day   And   -Inject 43 units  Under the skin 3 times a day    Angela Fierro is asking to clarify if it is supposed to be 40 units or 43?

## 2023-11-17 NOTE — TELEPHONE ENCOUNTER
[facility] called back today about Insulin instructions and gave them Dr. Paz Irving order of 43 units t.I.d.

## 2023-11-20 DIAGNOSIS — J45.909 ASTHMA: Primary | ICD-10-CM

## 2023-11-21 RX ORDER — BUDESONIDE AND FORMOTEROL FUMARATE DIHYDRATE 80; 4.5 UG/1; UG/1
2 AEROSOL RESPIRATORY (INHALATION) 2 TIMES DAILY
Qty: 10.2 G | Refills: 0 | Status: SHIPPED | OUTPATIENT
Start: 2023-11-21

## 2023-12-15 DIAGNOSIS — Z00.6 ENCOUNTER FOR EXAMINATION FOR NORMAL COMPARISON OR CONTROL IN CLINICAL RESEARCH PROGRAM: ICD-10-CM

## 2023-12-21 DIAGNOSIS — E78.5 HYPERLIPIDEMIA, UNSPECIFIED HYPERLIPIDEMIA TYPE: ICD-10-CM

## 2023-12-21 DIAGNOSIS — J45.909 ASTHMA: ICD-10-CM

## 2023-12-21 RX ORDER — ATORVASTATIN CALCIUM 20 MG/1
20 TABLET, FILM COATED ORAL
Qty: 90 TABLET | Refills: 0 | Status: SHIPPED | OUTPATIENT
Start: 2023-12-21

## 2023-12-21 RX ORDER — BUDESONIDE AND FORMOTEROL FUMARATE DIHYDRATE 80; 4.5 UG/1; UG/1
2 AEROSOL RESPIRATORY (INHALATION) 2 TIMES DAILY
Qty: 10.2 G | Refills: 0 | Status: SHIPPED | OUTPATIENT
Start: 2023-12-21

## 2023-12-22 ENCOUNTER — OFFICE VISIT (OUTPATIENT)
Dept: PULMONOLOGY | Facility: CLINIC | Age: 58
End: 2023-12-22
Payer: COMMERCIAL

## 2023-12-22 VITALS
HEIGHT: 72 IN | OXYGEN SATURATION: 97 % | TEMPERATURE: 97.7 F | BODY MASS INDEX: 29.04 KG/M2 | DIASTOLIC BLOOD PRESSURE: 88 MMHG | WEIGHT: 214.4 LBS | SYSTOLIC BLOOD PRESSURE: 138 MMHG | HEART RATE: 89 BPM

## 2023-12-22 DIAGNOSIS — J45.40 MODERATE PERSISTENT ASTHMA WITHOUT COMPLICATION: ICD-10-CM

## 2023-12-22 DIAGNOSIS — G47.33 OSA (OBSTRUCTIVE SLEEP APNEA): Primary | ICD-10-CM

## 2023-12-22 DIAGNOSIS — I10 PRIMARY HYPERTENSION: ICD-10-CM

## 2023-12-22 PROCEDURE — 99204 OFFICE O/P NEW MOD 45 MIN: CPT | Performed by: INTERNAL MEDICINE

## 2023-12-22 NOTE — ASSESSMENT & PLAN NOTE
He has moderate persistent asthma for a long time.  Currently his excess tolerance is good and he has occasional cough.  He stated that this cough worsened after an upper respiratory tract infection recently.  Currently this is getting better.  He has been on treatment with Symbicort regularly.  He has no hoarseness of voice or dysphagia.  His chest was clear to auscultation.  He has not had any recent PFT and I ordered 1.  He stated that he had a chest x-ray about a year back and that was normal.  His peak flow in the office was 630.  He stated that he had mild eosinophilia before.  I had a long discussion with him and answered all his questions.

## 2023-12-22 NOTE — PROGRESS NOTES
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  His overnight home sleep study from 5/2/2022 showed mild obstructive sleep apnea with significant oxygen desaturation.  He has been subsequently started on auto CPAP therapy to 16 cm of water using a fullface mask.  He is using the CPAP every night and is comfortable with the mask and pressure.  His symptoms have improved significantly.  He is very motivated to continue on CPAP therapy.  He is formal CPAP compliance records were not available.  I have advised him to continue with CPAP therapy as before.  He also has history of polycythemia.  I have ordered supplies.  I had a long discussion with him and answered all his questions.    Moderate persistent asthma without complication  He has moderate persistent asthma for a long time.  Currently his excess tolerance is good and he has occasional cough.  He stated that this cough worsened after an upper respiratory tract infection recently.  Currently this is getting better.  He has been on treatment with Symbicort regularly.  He has no hoarseness of voice or dysphagia.  His chest was clear to auscultation.  He has not had any recent PFT and I ordered 1.  He stated that he had a chest x-ray about a year back and that was normal.  His peak flow in the office was 630.  He stated that he had mild eosinophilia before.  I had a long discussion with him and answered all his questions.    Primary hypertension  He has hypertension.  He is currently on telmisartan.  He stated that he developed a cough with the lisinopril and losartan.  Also has diabetes.  He is on atorvastatin.       Diagnoses and all orders for this visit:    JENNY (obstructive sleep apnea)  -     PAP DME Resupply/Reorder    Moderate persistent asthma without complication  -     Complete PFT with post bronchodilator; Future    Primary  hypertension          Subjective:      Patient ID: Chanda Mccoy is a 58 y.o. male.    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  He was found to have mild obstructive sleep apnea with oxygen desaturation.  He was subsequently started on auto CPAP therapy and has been using the CPAP regularly.  He no more feels sleepy or tired during the day and his sleep is not interrupted.  He denied any morning headache.  He has no snoring now.  He has CPAP compliance on the phone was satisfactory.  His residual AHI was low.  His formal compliance records are not available.  I reviewed his sleep study results.  He is very motivated to continue on CPAP therapy and is getting clinical benefit from therapy.  He also has history of asthma for a long time.  He stated that his cough got worse after upper respiratory tract infection.  Currently he is on Symbicort 160/4.5 2 inhalation twice a day.  He rarely uses rescue albuterol inhaler.  Cough is better now.  He does not have any shortness of breath on routine activities.  He has occasional wheezing.  No chest pain no palpitation.  No swelling of feet.  No hoarseness of voice or dysphagia.  He denied any fever or chills.  Has history of allergies he was previously on immunotherapy.  He had occasional runny nose.        The following portions of the patient's history were reviewed and updated as appropriate: allergies, current medications, past family history, past medical history, past social history, past surgical history, and problem list.    Review of Systems   Constitutional:  Negative for appetite change, chills, fatigue and fever.   HENT:  Positive for rhinorrhea. Negative for hearing loss, sneezing, sore throat and trouble swallowing.    Eyes:  Positive for redness. Negative for visual disturbance.    Respiratory:  Positive for cough, shortness of breath and wheezing. Negative for chest tightness.    Cardiovascular:  Negative for chest pain, palpitations and leg swelling.   Gastrointestinal:  Negative for abdominal pain, constipation, diarrhea, nausea and vomiting.   Genitourinary:  Negative for dysuria, frequency and urgency.   Musculoskeletal:  Negative for arthralgias, back pain and gait problem.   Skin:  Negative for rash.   Allergic/Immunologic: Positive for environmental allergies (immunotherapy).   Neurological:  Negative for dizziness, syncope, light-headedness and headaches.   Psychiatric/Behavioral:  Negative for agitation, confusion and sleep disturbance. The patient is not nervous/anxious.          Objective:      /88 (BP Location: Left arm, Patient Position: Sitting, Cuff Size: Standard)   Pulse 89   Temp 97.7 °F (36.5 °C) (Tympanic)   Ht 6' (1.829 m)   Wt 97.3 kg (214 lb 6.4 oz)   SpO2 97%   BMI 29.08 kg/m²          Physical Exam  Vitals reviewed.   Constitutional:       General: He is not in acute distress.     Appearance: He is not ill-appearing, toxic-appearing or diaphoretic.   HENT:      Head: Normocephalic.      Nose:      Comments: Deviated nasal septum to the left     Mouth/Throat:      Mouth: Mucous membranes are moist.      Pharynx: Oropharynx is clear.      Comments: Oropharyngeal crowding  Eyes:      General: No scleral icterus.     Conjunctiva/sclera: Conjunctivae normal.   Cardiovascular:      Rate and Rhythm: Normal rate and regular rhythm.      Heart sounds: Normal heart sounds. No murmur heard.  Pulmonary:      Effort: Pulmonary effort is normal. No respiratory distress.      Breath sounds: Normal breath sounds. No stridor. No wheezing, rhonchi or rales.      Comments: PEFR 630  Chest:      Chest wall: No tenderness.   Abdominal:      General: Bowel sounds are normal.      Palpations: Abdomen is soft.      Tenderness: There is no abdominal tenderness. There is no  guarding.   Musculoskeletal:      Cervical back: Neck supple. No rigidity.      Right lower leg: No edema.      Left lower leg: No edema.   Lymphadenopathy:      Cervical: No cervical adenopathy.   Skin:     Coloration: Skin is not jaundiced or pale.      Findings: No lesion or rash.   Neurological:      Mental Status: He is alert and oriented to person, place, and time.      Gait: Gait normal.   Psychiatric:         Mood and Affect: Mood normal.         Behavior: Behavior normal.         Thought Content: Thought content normal.         Judgment: Judgment normal.

## 2023-12-22 NOTE — ASSESSMENT & PLAN NOTE
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  His overnight home sleep study from 5/2/2022 showed mild obstructive sleep apnea with significant oxygen desaturation.  He has been subsequently started on auto CPAP therapy to 16 cm of water using a fullface mask.  He is using the CPAP every night and is comfortable with the mask and pressure.  His symptoms have improved significantly.  He is very motivated to continue on CPAP therapy.  He is formal CPAP compliance records were not available.  I have advised him to continue with CPAP therapy as before.  He also has history of polycythemia.  I have ordered supplies.  I had a long discussion with him and answered all his questions.

## 2023-12-22 NOTE — ASSESSMENT & PLAN NOTE
He has hypertension.  He is currently on telmisartan.  He stated that he developed a cough with the lisinopril and losartan.  Also has diabetes.  He is on atorvastatin.

## 2023-12-28 ENCOUNTER — APPOINTMENT (OUTPATIENT)
Dept: LAB | Facility: CLINIC | Age: 58
End: 2023-12-28
Payer: COMMERCIAL

## 2023-12-28 DIAGNOSIS — Z00.6 ENCOUNTER FOR EXAMINATION FOR NORMAL COMPARISON OR CONTROL IN CLINICAL RESEARCH PROGRAM: ICD-10-CM

## 2023-12-28 PROCEDURE — 36415 COLL VENOUS BLD VENIPUNCTURE: CPT

## 2024-01-10 ENCOUNTER — HOSPITAL ENCOUNTER (OUTPATIENT)
Dept: PULMONOLOGY | Facility: HOSPITAL | Age: 59
Discharge: HOME | End: 2024-01-10
Payer: COMMERCIAL

## 2024-01-10 DIAGNOSIS — J45.40 MODERATE PERSISTENT ASTHMA WITHOUT COMPLICATION: ICD-10-CM

## 2024-01-10 PROCEDURE — 94726 PLETHYSMOGRAPHY LUNG VOLUMES: CPT | Performed by: INTERNAL MEDICINE

## 2024-01-10 PROCEDURE — 94760 N-INVAS EAR/PLS OXIMETRY 1: CPT

## 2024-01-10 PROCEDURE — 94726 PLETHYSMOGRAPHY LUNG VOLUMES: CPT

## 2024-01-10 PROCEDURE — 94060 EVALUATION OF WHEEZING: CPT | Performed by: INTERNAL MEDICINE

## 2024-01-10 PROCEDURE — 94060 EVALUATION OF WHEEZING: CPT

## 2024-01-10 PROCEDURE — 94729 DIFFUSING CAPACITY: CPT | Performed by: INTERNAL MEDICINE

## 2024-01-10 PROCEDURE — 94729 DIFFUSING CAPACITY: CPT

## 2024-01-10 RX ORDER — ALBUTEROL SULFATE 2.5 MG/3ML
2.5 SOLUTION RESPIRATORY (INHALATION) ONCE
Status: COMPLETED | OUTPATIENT
Start: 2024-01-10 | End: 2024-01-10

## 2024-01-10 RX ADMIN — ALBUTEROL SULFATE 2.5 MG: 2.5 SOLUTION RESPIRATORY (INHALATION) at 16:24

## 2024-01-18 LAB
APOB+LDLR+PCSK9 GENE MUT ANL BLD/T: NOT DETECTED
BRCA1+BRCA2 DEL+DUP + FULL MUT ANL BLD/T: NOT DETECTED
MLH1+MSH2+MSH6+PMS2 GN DEL+DUP+FUL M: NOT DETECTED

## 2024-01-23 DIAGNOSIS — I10 ESSENTIAL HYPERTENSION: ICD-10-CM

## 2024-01-23 DIAGNOSIS — J45.909 ASTHMA: ICD-10-CM

## 2024-01-23 RX ORDER — TELMISARTAN 20 MG/1
20 TABLET ORAL DAILY
Qty: 90 TABLET | Refills: 0 | Status: SHIPPED | OUTPATIENT
Start: 2024-01-23

## 2024-01-23 RX ORDER — BUDESONIDE AND FORMOTEROL FUMARATE DIHYDRATE 80; 4.5 UG/1; UG/1
2 AEROSOL RESPIRATORY (INHALATION) 2 TIMES DAILY
Qty: 10.2 G | Refills: 0 | Status: SHIPPED | OUTPATIENT
Start: 2024-01-23

## 2024-01-27 DIAGNOSIS — G47.33 OSA (OBSTRUCTIVE SLEEP APNEA): Primary | ICD-10-CM

## 2024-01-27 NOTE — PROGRESS NOTES
CPAP Supplies reordered.  Original order placed on 12/22/2023.  The DME states that they did not receive the original order.

## 2024-01-31 ENCOUNTER — TELEPHONE (OUTPATIENT)
Dept: PULMONOLOGY | Facility: CLINIC | Age: 59
End: 2024-01-31

## 2024-01-31 LAB

## 2024-01-31 NOTE — TELEPHONE ENCOUNTER
"Telephone call from [facility] that they received a script for CPAP supplies but they need an updated one that includes \"standard tubing 7037.\"     You can reach them at [phone] for further questions.    Their fax to fax an updated script is [phone]  "

## 2024-02-23 DIAGNOSIS — Z11.1 SCREENING EXAMINATION FOR PULMONARY TUBERCULOSIS: Primary | ICD-10-CM

## 2024-03-08 DIAGNOSIS — J45.909 ASTHMA: ICD-10-CM

## 2024-03-08 RX ORDER — BUDESONIDE AND FORMOTEROL FUMARATE DIHYDRATE 80; 4.5 UG/1; UG/1
2 AEROSOL RESPIRATORY (INHALATION) 2 TIMES DAILY
Qty: 10.2 G | Refills: 0 | Status: SHIPPED | OUTPATIENT
Start: 2024-03-08

## 2024-04-04 DIAGNOSIS — J45.909 ASTHMA: ICD-10-CM

## 2024-04-04 RX ORDER — BUDESONIDE AND FORMOTEROL FUMARATE DIHYDRATE 80; 4.5 UG/1; UG/1
2 AEROSOL RESPIRATORY (INHALATION) 2 TIMES DAILY
Qty: 30.6 G | Refills: 2 | Status: SHIPPED | OUTPATIENT
Start: 2024-04-04

## 2024-04-11 ENCOUNTER — TELEPHONE (OUTPATIENT)
Age: 59
End: 2024-04-11

## 2024-04-11 NOTE — TELEPHONE ENCOUNTER
Scheduled date of colonoscopy (as of today): 5/14   Physician performing colonoscopy: BETSY   Location of colonoscopy: [facility]   Bowel prep reviewed with patient: TIN/JOHANN   Instructions reviewed with patient by: MAXINE  Clearances: NONE     CHANGED PROVIDER DUE TO PATIENTS SOFIA

## 2024-04-18 ENCOUNTER — TELEPHONE (OUTPATIENT)
Dept: GASTROENTEROLOGY | Facility: MEDICAL CENTER | Age: 59
End: 2024-04-18

## 2024-04-18 NOTE — TELEPHONE ENCOUNTER
Called patient to reschedule colonoscopy to Dr. Mortensen's schedule on 5/8; patient unable to do that date.

## 2024-04-25 ENCOUNTER — HOSPITAL ENCOUNTER (OUTPATIENT)
Dept: RADIOLOGY | Facility: HOSPITAL | Age: 59
End: 2024-04-25
Payer: COMMERCIAL

## 2024-04-25 DIAGNOSIS — J45.40 MODERATE PERSISTENT ASTHMA WITHOUT COMPLICATION: Primary | ICD-10-CM

## 2024-04-25 DIAGNOSIS — Z79.4 TYPE 2 DIABETES MELLITUS WITHOUT COMPLICATION, WITH LONG-TERM CURRENT USE OF INSULIN (HCC): Primary | ICD-10-CM

## 2024-04-25 DIAGNOSIS — E11.9 TYPE 2 DIABETES MELLITUS WITHOUT COMPLICATION, WITH LONG-TERM CURRENT USE OF INSULIN (HCC): Primary | ICD-10-CM

## 2024-04-25 DIAGNOSIS — I10 ESSENTIAL HYPERTENSION: ICD-10-CM

## 2024-04-25 DIAGNOSIS — E78.1 HYPERTRIGLYCERIDEMIA: ICD-10-CM

## 2024-04-25 DIAGNOSIS — E78.5 HYPERLIPIDEMIA, UNSPECIFIED HYPERLIPIDEMIA TYPE: ICD-10-CM

## 2024-04-25 DIAGNOSIS — J45.40 MODERATE PERSISTENT ASTHMA WITHOUT COMPLICATION: ICD-10-CM

## 2024-04-25 PROCEDURE — 71046 X-RAY EXAM CHEST 2 VIEWS: CPT

## 2024-04-26 RX ORDER — ICOSAPENT ETHYL 1 G/1
1 CAPSULE ORAL 2 TIMES DAILY
Qty: 60 CAPSULE | Refills: 0 | Status: SHIPPED | OUTPATIENT
Start: 2024-04-26 | End: 2024-07-25

## 2024-04-26 RX ORDER — TELMISARTAN 20 MG/1
20 TABLET ORAL DAILY
Qty: 90 TABLET | Refills: 0 | Status: SHIPPED | OUTPATIENT
Start: 2024-04-26

## 2024-04-26 RX ORDER — ACYCLOVIR 400 MG/1
1 TABLET ORAL
Qty: 9 EACH | Refills: 0 | Status: SHIPPED | OUTPATIENT
Start: 2024-04-26

## 2024-04-26 RX ORDER — ATORVASTATIN CALCIUM 20 MG/1
20 TABLET, FILM COATED ORAL
Qty: 90 TABLET | Refills: 1 | Status: SHIPPED | OUTPATIENT
Start: 2024-04-26

## 2024-04-26 RX ORDER — INSULIN ASPART 100 [IU]/ML
INJECTION, SOLUTION INTRAVENOUS; SUBCUTANEOUS
Qty: 100 ML | Refills: 0 | Status: SHIPPED | OUTPATIENT
Start: 2024-04-26

## 2024-04-26 RX ORDER — INSULIN GLARGINE-YFGN 100 [IU]/ML
40 INJECTION, SOLUTION SUBCUTANEOUS DAILY
Qty: 100 ML | Refills: 0 | Status: SHIPPED | OUTPATIENT
Start: 2024-04-26

## 2024-04-30 ENCOUNTER — ANESTHESIA (OUTPATIENT)
Dept: ANESTHESIOLOGY | Facility: HOSPITAL | Age: 59
End: 2024-04-30

## 2024-04-30 ENCOUNTER — ANESTHESIA EVENT (OUTPATIENT)
Dept: ANESTHESIOLOGY | Facility: HOSPITAL | Age: 59
End: 2024-04-30

## 2024-05-10 ENCOUNTER — APPOINTMENT (OUTPATIENT)
Dept: LAB | Facility: CLINIC | Age: 59
End: 2024-05-10
Payer: COMMERCIAL

## 2024-05-10 DIAGNOSIS — E78.5 HYPERLIPIDEMIA: ICD-10-CM

## 2024-05-10 DIAGNOSIS — Z12.5 PROSTATE CANCER SCREENING: ICD-10-CM

## 2024-05-10 DIAGNOSIS — E11.9 DIABETES (HCC): ICD-10-CM

## 2024-05-10 LAB
ANION GAP SERPL CALCULATED.3IONS-SCNC: 7 MMOL/L (ref 4–13)
BUN SERPL-MCNC: 16 MG/DL (ref 5–25)
CALCIUM SERPL-MCNC: 9.5 MG/DL (ref 8.4–10.2)
CHLORIDE SERPL-SCNC: 104 MMOL/L (ref 96–108)
CHOLEST SERPL-MCNC: 133 MG/DL
CO2 SERPL-SCNC: 28 MMOL/L (ref 21–32)
CREAT SERPL-MCNC: 1.38 MG/DL (ref 0.6–1.3)
CREAT UR-MCNC: 181.1 MG/DL
GFR SERPL CREATININE-BSD FRML MDRD: 55 ML/MIN/1.73SQ M
GLUCOSE P FAST SERPL-MCNC: 147 MG/DL (ref 65–99)
HDLC SERPL-MCNC: 34 MG/DL
LDLC SERPL CALC-MCNC: 59 MG/DL (ref 0–100)
MICROALBUMIN UR-MCNC: 59.9 MG/L
MICROALBUMIN/CREAT 24H UR: 33 MG/G CREATININE (ref 0–30)
NONHDLC SERPL-MCNC: 99 MG/DL
POTASSIUM SERPL-SCNC: 4 MMOL/L (ref 3.5–5.3)
PSA SERPL-MCNC: 1.17 NG/ML (ref 0–4)
SODIUM SERPL-SCNC: 139 MMOL/L (ref 135–147)
TRIGL SERPL-MCNC: 201 MG/DL

## 2024-05-10 PROCEDURE — 80061 LIPID PANEL: CPT

## 2024-05-10 PROCEDURE — G0103 PSA SCREENING: HCPCS

## 2024-05-10 PROCEDURE — 36415 COLL VENOUS BLD VENIPUNCTURE: CPT

## 2024-05-10 PROCEDURE — 80048 BASIC METABOLIC PNL TOTAL CA: CPT

## 2024-05-10 PROCEDURE — 82570 ASSAY OF URINE CREATININE: CPT

## 2024-05-10 PROCEDURE — 82043 UR ALBUMIN QUANTITATIVE: CPT

## 2024-05-11 RX ORDER — SODIUM CHLORIDE 9 MG/ML
125 INJECTION, SOLUTION INTRAVENOUS CONTINUOUS
Status: CANCELLED | OUTPATIENT
Start: 2024-05-11

## 2024-05-14 ENCOUNTER — HOSPITAL ENCOUNTER (OUTPATIENT)
Dept: GASTROENTEROLOGY | Facility: MEDICAL CENTER | Age: 59
Setting detail: OUTPATIENT SURGERY
Discharge: HOME | End: 2024-05-14
Attending: INTERNAL MEDICINE
Payer: COMMERCIAL

## 2024-05-14 ENCOUNTER — ANESTHESIA (OUTPATIENT)
Dept: GASTROENTEROLOGY | Facility: MEDICAL CENTER | Age: 59
End: 2024-05-14

## 2024-05-14 ENCOUNTER — ANESTHESIA EVENT (OUTPATIENT)
Dept: GASTROENTEROLOGY | Facility: MEDICAL CENTER | Age: 59
End: 2024-05-14

## 2024-05-14 VITALS
RESPIRATION RATE: 18 BRPM | HEART RATE: 86 BPM | DIASTOLIC BLOOD PRESSURE: 59 MMHG | TEMPERATURE: 97.8 F | OXYGEN SATURATION: 95 % | SYSTOLIC BLOOD PRESSURE: 103 MMHG

## 2024-05-14 DIAGNOSIS — Z86.010 HISTORY OF COLON POLYPS: ICD-10-CM

## 2024-05-14 PROCEDURE — 88305 TISSUE EXAM BY PATHOLOGIST: CPT | Performed by: PATHOLOGY

## 2024-05-14 PROCEDURE — 45385 COLONOSCOPY W/LESION REMOVAL: CPT | Performed by: INTERNAL MEDICINE

## 2024-05-14 RX ORDER — PROPOFOL 10 MG/ML
INJECTION, EMULSION INTRAVENOUS AS NEEDED
Status: DISCONTINUED | OUTPATIENT
Start: 2024-05-14 | End: 2024-05-14

## 2024-05-14 RX ORDER — SODIUM CHLORIDE 9 MG/ML
125 INJECTION, SOLUTION INTRAVENOUS CONTINUOUS
Status: DISCONTINUED | OUTPATIENT
Start: 2024-05-14 | End: 2024-05-18 | Stop reason: HOSPADM

## 2024-05-14 RX ORDER — LIDOCAINE HYDROCHLORIDE 20 MG/ML
INJECTION, SOLUTION EPIDURAL; INFILTRATION; INTRACAUDAL; PERINEURAL AS NEEDED
Status: DISCONTINUED | OUTPATIENT
Start: 2024-05-14 | End: 2024-05-14

## 2024-05-14 RX ADMIN — PROPOFOL 30 MG: 10 INJECTION, EMULSION INTRAVENOUS at 07:35

## 2024-05-14 RX ADMIN — PROPOFOL 120 MG: 10 INJECTION, EMULSION INTRAVENOUS at 07:33

## 2024-05-14 RX ADMIN — PROPOFOL 20 MG: 10 INJECTION, EMULSION INTRAVENOUS at 07:56

## 2024-05-14 RX ADMIN — PROPOFOL 30 MG: 10 INJECTION, EMULSION INTRAVENOUS at 07:48

## 2024-05-14 RX ADMIN — PROPOFOL 30 MG: 10 INJECTION, EMULSION INTRAVENOUS at 07:44

## 2024-05-14 RX ADMIN — PROPOFOL 30 MG: 10 INJECTION, EMULSION INTRAVENOUS at 07:50

## 2024-05-14 RX ADMIN — LIDOCAINE HYDROCHLORIDE 100 MG: 20 INJECTION, SOLUTION EPIDURAL; INFILTRATION; INTRACAUDAL at 07:33

## 2024-05-14 RX ADMIN — PROPOFOL 30 MG: 10 INJECTION, EMULSION INTRAVENOUS at 07:41

## 2024-05-14 RX ADMIN — PROPOFOL 20 MG: 10 INJECTION, EMULSION INTRAVENOUS at 07:39

## 2024-05-14 RX ADMIN — PROPOFOL 30 MG: 10 INJECTION, EMULSION INTRAVENOUS at 07:37

## 2024-05-14 RX ADMIN — PROPOFOL 20 MG: 10 INJECTION, EMULSION INTRAVENOUS at 07:54

## 2024-05-14 RX ADMIN — PROPOFOL 30 MG: 10 INJECTION, EMULSION INTRAVENOUS at 07:46

## 2024-05-14 RX ADMIN — SODIUM CHLORIDE 125 ML/HR: 0.9 INJECTION, SOLUTION INTRAVENOUS at 07:12

## 2024-05-14 RX ADMIN — PROPOFOL 20 MG: 10 INJECTION, EMULSION INTRAVENOUS at 07:52

## 2024-05-14 NOTE — H&P
History and Physical -  [facility]  Chanda Mccoy 58 y.o. male MRN: [id number]    HPI: Chanda Mccoy is a 58 y.o. year old male who presents with colon cancer screening.       Review of Systems    Historical Information   Past Medical History:   Diagnosis Date    Asthma     CPAP (continuous positive airway pressure) dependence     Diabetes mellitus (HCC)     Hyperlipidemia     Sleep apnea      Past Surgical History:   Procedure Laterality Date    APPENDECTOMY      BRONCHOGRAM      at age 2    IN COLONOSCOPY FLX DX W/COLLJ SPEC WHEN PFRMD N/A 06/18/2018    Procedure: COLONOSCOPY with polypectomy;  Surgeon: Roderick Pelayo MD;  Location: [facility];  Service: Gastroenterology    VASECTOMY       Social History   Social History     Substance and Sexual Activity   Alcohol Use Yes    Alcohol/week: 1.0 standard drink of alcohol    Types: 1 Glasses of wine per week    Comment: 1 x a month     Social History     Substance and Sexual Activity   Drug Use No     Social History     Tobacco Use   Smoking Status Never   Smokeless Tobacco Never     Family History   Problem Relation Age of Onset    Diabetes Family     Glaucoma Family     Hypothyroidism Family        Meds/Allergies     (Not in a hospital admission)      Allergies   Allergen Reactions    Shellfish-Derived Products - Food Allergy Lip Swelling    Jardiance [Empagliflozin] Other (See Comments)     Skin ulcers        Objective     There were no vitals taken for this visit.      PHYSICAL EXAM    Gen: NAD  CV: RRR  CHEST: Clear  ABD: soft, NT/ND  EXT: no edema  Neuro: AAO      ASSESSMENT/PLAN:  This is a 58 y.o. year old male here for colonoscopy for colon cancer screening.     PLAN:   Procedure: colonoscopy.

## 2024-05-14 NOTE — ANESTHESIA POSTPROCEDURE EVALUATION
Post-Op Assessment Note    CV Status:  Stable    Pain management: adequate       Mental Status:  Alert and awake   Hydration Status:  Euvolemic   PONV Controlled:  Controlled   Airway Patency:  Patent     Post Op Vitals Reviewed: Yes    No anethesia notable event occurred.    Staff: Anesthesiologist               BP      Temp      Pulse     Resp      SpO2      /59   Pulse 86   Temp 97.8 °F (36.6 °C) (Temporal)   Resp 18   SpO2 95%

## 2024-05-14 NOTE — ANESTHESIA PREPROCEDURE EVALUATION
Procedure:  COLONOSCOPY    Relevant Problems   CARDIO   (+) Primary hypertension      PULMONARY   (+) Idiopathic sleep related nonobstructive alveolar hypoventilation   (+) Moderate persistent asthma without complication   (+) JENNY (obstructive sleep apnea)        Physical Exam    Airway    Mallampati score: III  TM Distance: >3 FB  Neck ROM: full     Dental       Cardiovascular  Rhythm: regular    Pulmonary  Comment: Bilateral chest rise, not using accessory muscles for breathing     Other Findings        Anesthesia Plan  ASA Score- 2     Anesthesia Type- IV sedation with anesthesia with ASA Monitors.         Additional Monitors:     Airway Plan:            Plan Factors-    Chart reviewed.   Existing labs reviewed.                   Induction- intravenous.    Postoperative Plan-     Informed Consent- Anesthetic plan and risks discussed with patient.

## 2024-05-19 DIAGNOSIS — E11.9 TYPE 2 DIABETES MELLITUS WITHOUT COMPLICATION, WITH LONG-TERM CURRENT USE OF INSULIN (HCC): ICD-10-CM

## 2024-05-19 DIAGNOSIS — Z79.4 TYPE 2 DIABETES MELLITUS WITHOUT COMPLICATION, WITH LONG-TERM CURRENT USE OF INSULIN (HCC): ICD-10-CM

## 2024-05-19 RX ORDER — INSULIN ASPART 100 [IU]/ML
INJECTION, SOLUTION INTRAVENOUS; SUBCUTANEOUS
Qty: 100 ML | Refills: 1 | Status: SHIPPED | OUTPATIENT
Start: 2024-05-19

## 2024-05-20 PROCEDURE — 88305 TISSUE EXAM BY PATHOLOGIST: CPT | Performed by: PATHOLOGY

## 2024-05-23 NOTE — RESULT ENCOUNTER NOTE
Inform patient via Videoflow.  Please review the pathology/lab result of further discussion.    Copied from Videoflow message :       Cinthia Ramirez,     Your colon polyp(s) were benign but precancerous. These were successfully removed. This means the risk of these progressing to cancer is eliminated. This is great news. I would recommend to repeat colonoscopy in 3 years. Thank you.       Best regards,     Yg Mortensen MD

## 2024-06-01 ENCOUNTER — TREATMENT (OUTPATIENT)
Dept: OTHER | Facility: HOSPITAL | Age: 59
End: 2024-06-01

## 2024-06-01 DIAGNOSIS — Z79.4 TYPE 2 DIABETES MELLITUS WITH HYPERGLYCEMIA, WITH LONG-TERM CURRENT USE OF INSULIN (HCC): ICD-10-CM

## 2024-06-01 DIAGNOSIS — Z79.4 TYPE 2 DIABETES MELLITUS WITH HYPERGLYCEMIA, WITH LONG-TERM CURRENT USE OF INSULIN (HCC): Primary | ICD-10-CM

## 2024-06-01 DIAGNOSIS — E11.65 TYPE 2 DIABETES MELLITUS WITH HYPERGLYCEMIA, WITH LONG-TERM CURRENT USE OF INSULIN (HCC): ICD-10-CM

## 2024-06-01 DIAGNOSIS — E11.65 TYPE 2 DIABETES MELLITUS WITH HYPERGLYCEMIA, WITH LONG-TERM CURRENT USE OF INSULIN (HCC): Primary | ICD-10-CM

## 2024-06-01 NOTE — PROGRESS NOTES
D/w Dr. Mccoy.  We reviewed his diabetes over phone and based on current hyperglycemia we added an ozempic sample 4 weeks ago to see response/side effects.  He tolerated well.    We agreed to increase ozempic and downtitrate prandial insulin followed by basal insulin therapy.  Agreed to increase lifestyle change mostly with strength training activity.    Will set up initial appointment to complete physical  exam and establish care.

## 2024-06-06 ENCOUNTER — TELEMEDICINE (OUTPATIENT)
Dept: ENDOCRINOLOGY | Facility: CLINIC | Age: 59
End: 2024-06-06
Payer: COMMERCIAL

## 2024-06-06 DIAGNOSIS — E11.65 TYPE 2 DIABETES MELLITUS WITH HYPERGLYCEMIA, WITH LONG-TERM CURRENT USE OF INSULIN (HCC): Primary | ICD-10-CM

## 2024-06-06 DIAGNOSIS — E55.9 VITAMIN D DEFICIENCY: ICD-10-CM

## 2024-06-06 DIAGNOSIS — E11.9 TYPE 2 DIABETES MELLITUS WITHOUT COMPLICATION, WITH LONG-TERM CURRENT USE OF INSULIN (HCC): ICD-10-CM

## 2024-06-06 DIAGNOSIS — E78.2 MIXED HYPERLIPIDEMIA: ICD-10-CM

## 2024-06-06 DIAGNOSIS — Z79.4 TYPE 2 DIABETES MELLITUS WITH HYPERGLYCEMIA, WITH LONG-TERM CURRENT USE OF INSULIN (HCC): Primary | ICD-10-CM

## 2024-06-06 DIAGNOSIS — E88.819 INSULIN RESISTANCE: ICD-10-CM

## 2024-06-06 DIAGNOSIS — Z79.4 TYPE 2 DIABETES MELLITUS WITHOUT COMPLICATION, WITH LONG-TERM CURRENT USE OF INSULIN (HCC): ICD-10-CM

## 2024-06-06 PROCEDURE — 99204 OFFICE O/P NEW MOD 45 MIN: CPT | Performed by: INTERNAL MEDICINE

## 2024-06-06 RX ORDER — INSULIN ASPART 100 [IU]/ML
INJECTION, SOLUTION INTRAVENOUS; SUBCUTANEOUS
Qty: 100 ML | Refills: 1 | Status: SHIPPED | OUTPATIENT
Start: 2024-06-06

## 2024-06-06 RX ORDER — PEN NEEDLE, DIABETIC 32GX 5/32"
NEEDLE, DISPOSABLE MISCELLANEOUS
Qty: 200 EACH | Refills: 1 | Status: SHIPPED | OUTPATIENT
Start: 2024-06-06

## 2024-06-06 RX ORDER — INSULIN DEGLUDEC 100 U/ML
40 INJECTION, SOLUTION SUBCUTANEOUS
Qty: 30 ML | Refills: 1 | Status: SHIPPED | OUTPATIENT
Start: 2024-06-06

## 2024-06-06 NOTE — ASSESSMENT & PLAN NOTE
ASCVD risk is 12%. This requires high potency statin therapy - on lipitor 20 with LDL and non HDL at goal.  May consider a coronary artery calcium score if 0, we may consider downgrading risk.

## 2024-06-06 NOTE — ASSESSMENT & PLAN NOTE
He is uncontrolled. Goal is A1c <7% associated with BMI 24 and minimal use of insulin therapy.    Today we discussed all aspects of diabetes including pathophysiology, risk factors, complications, SAGM, CGM, diet, lifestyle modifications, medical fitness training, diabetes education, goals of therapy, follow up needs and medications. Goal capillary glucose 70-180mg/dL.    He could not tolerate metformin. Previously tried Januvia(ineffective), sulfonylureas(ineffective) and Jardiance(skin rash).  We had started on ozempic with samples and Rx sent for Ozempic 1mg weekly dose after moving from 0.25 to 0.5mg last week.  At this time strategy should be to reduce insulin resistance with diet/lifestyle and medications.    He reports reduced dose of insulin since starting Ozempic.  We agreed to continue ozempic and Basal insulin 40u qhs and start reducing prandial insulin - Rx sent.  Advised to initiate medical fitness training program for muscle strengthening exercise involving abdominal, gluteal and core muscles. Once exercise is regular for 4 weeks, we may add fasting -early dinner and late breakfast.    Send cgm data in 2-3 weeks.  Follow up in 3 months.          Lab Results   Component Value Date    HGBA1C 7.6 (H) 03/10/2023

## 2024-06-06 NOTE — PATIENT INSTRUCTIONS
Instructions    Diet:   Take 1500 john/day of balanced diet with 1/3rd carbs, 1/3rd protein and rest fiber and small amount fat.   Drink at least 64 oz fluids daily.    Lifestyle:   30 min brisk activity most days. 150min-220min/week of cardiac and muscle building exercise that causes sweating.  Consider [facility] medical fitness training program.  Medical fitness: follow these exercise links  Full Version - [email or web address]     Warmup - [email or web address]     Lower Body - [email or web address]     Upper Body - [email or web address]     Core -  [email or web address]    Fasting:  Can consider after becoming regular with exercise - 14 to 24 hrs fasting 1-3 times a week.    Medications:   look up Wegovy, Zepbound, saxenda - weight loss meds.  Consider switching from medications that cause weight gain to weight neutral medications.    Other:   Make sure you are treated appropriately if you have sleep apnea.  May consider bariatric surgery if we dont see benefit over 6-12 months of all above.

## 2024-06-06 NOTE — ASSESSMENT & PLAN NOTE
He has clinical and biochemical evidence of insulin resistance. This may be the basis of Type 2 diabetes.

## 2024-06-06 NOTE — PROGRESS NOTES
REQUIRED DOCUMENTATION:      1. This service was provided via Telemedicine -[facility]  2. Provider located at [place], [place].  3. TeleMed provider: Asif Martines MD.  4. Identify all parties in room with patient during tele consult:  5.Patient was then informed that this was a Telemedicine visit and that the exam was being conducted confidentially over secure lines. My office door was closed. No one else was in the room.  Patient acknowledged consent and understanding of privacy and security of the Telemedicine visit, and gave us permission to have the assistant stay in the room in order to assist with the history and to conduct the exam.  I informed the patient that I have reviewed their record in Epic and presented the opportunity for them to ask any questions regarding the visit today.  The patient agreed to participate.     Patient is aware this is a billable service.       New Consult Note      CC: Type 2 diabetes    History of Present Illness:   58yr male with Type 2 diabetes since 2009, HTN, HLD, insulin resistance, microalbuminuria, JENNY and vit d deficiency.    He reports weight gain, reduced endurance since covid 19 infection and progressive hyperglycemia in spite of increasing doses of insulin.    SAGM:    Current meds:  Lantus 40u QHS  Novolog 43u TIDAC    Opthamology:   Podiatry:   vaccination:   Dental:  Pancreatitis:     Ace/ARB: telmisartan  Statin:Lipitor  Thyroid issues: No      Physical Exam:  There is no height or weight on file to calculate BMI.  There were no vitals taken for this visit.   There were no vitals filed for this visit.     Physical Exam  Constitutional:       General: He is not in acute distress.     Appearance: He is well-developed.   HENT:      Head: Normocephalic and atraumatic.      Nose: Nose normal.   Eyes:      Conjunctiva/sclera: Conjunctivae normal.   Pulmonary:      Effort: Pulmonary effort is normal.   Abdominal:      General: There is no distension.    Musculoskeletal:      Cervical back: Normal range of motion and neck supple.   Skin:     Findings: No rash.      Comments: No icterus   Neurological:      Mental Status: He is alert and oriented to person, place, and time.         Labs:   Lab Results   Component Value Date    HGBA1C 7.6 (H) 03/10/2023       Lab Results   Component Value Date    LJX9ATIIJVCL 1.275 03/10/2023       Lab Results   Component Value Date    CREATININE 1.38 (H) 05/10/2024    CREATININE 1.25 03/10/2023    CREATININE 1.25 11/05/2022    BUN 16 05/10/2024     01/29/2014    K 4.0 05/10/2024     05/10/2024    CO2 28 05/10/2024     eGFR   Date Value Ref Range Status   05/10/2024 55 ml/min/1.73sq m Final       Lab Results   Component Value Date    ALT 52 04/28/2022    AST 24 04/28/2022    ALKPHOS 68 04/28/2022    BILITOT 0.43 01/29/2014       Lab Results   Component Value Date    CHOLESTEROL 133 05/10/2024    CHOLESTEROL 160 11/11/2023    CHOLESTEROL 136 03/10/2023     Lab Results   Component Value Date    HDL 34 (L) 05/10/2024    HDL 39 (L) 11/11/2023    HDL 31 (L) 03/10/2023     Lab Results   Component Value Date    TRIG 201 (H) 05/10/2024    TRIG 245 (H) 11/11/2023    TRIG 458 (H) 03/10/2023     Lab Results   Component Value Date    NONHDLC 99 05/10/2024    NONHDLC 121 11/11/2023    NONHDLC 105 03/10/2023         Assessment/Plan:    1. Type 2 diabetes mellitus with hyperglycemia, with long-term current use of insulin (HCC)  Assessment & Plan:  He is uncontrolled. Goal is A1c <7% associated with BMI 24 and minimal use of insulin therapy.    Today we discussed all aspects of diabetes including pathophysiology, risk factors, complications, SAGM, CGM, diet, lifestyle modifications, medical fitness training, diabetes education, goals of therapy, follow up needs and medications. Goal capillary glucose 70-180mg/dL.    He could not tolerate metformin. Previously tried Januvia(ineffective), sulfonylureas(ineffective) and Jardiance(skin  rash).  We had started on ozempic with samples and Rx sent for Ozempic 1mg weekly dose after moving from 0.25 to 0.5mg last week.  At this time strategy should be to reduce insulin resistance with diet/lifestyle and medications.    He reports reduced dose of insulin since starting Ozempic.  We agreed to continue ozempic and Basal insulin 40u qhs and start reducing prandial insulin - Rx sent.  Advised to initiate medical fitness training program for muscle strengthening exercise involving abdominal, gluteal and core muscles. Once exercise is regular for 4 weeks, we may add fasting -early dinner and late breakfast.    Send cgm data in 2-3 weeks.  Follow up in 3 months.          Lab Results   Component Value Date    HGBA1C 7.6 (H) 03/10/2023     2. Insulin resistance  Assessment & Plan:  He has clinical and biochemical evidence of insulin resistance. This may be the basis of Type 2 diabetes.  3. Vitamin D deficiency  Assessment & Plan:  May have deficiency with weight gain. Suggest vit D3 5000IU daily OTC.  4. Mixed hyperlipidemia  Assessment & Plan:  ASCVD risk is 12%. This requires high potency statin therapy - on lipitor 20 with LDL and non HDL at goal.  May consider a coronary artery calcium score if 0, we may consider downgrading risk.  5. Type 2 diabetes mellitus without complication, with long-term current use of insulin (HCC)  -     insulin aspart (NovoLOG FlexPen) 100 UNIT/ML injection pen; Use 20u TIDAC plus 2u/50 above 150mg/dL; max 80u daily  -     insulin degludec (Tresiba FlexTouch) 100 units/mL injection pen; Inject 40 Units under the skin daily at bedtime  -     Insulin Pen Needle (BD Pen Needle Anat U/F) 32G X 4 MM MISC; Inject under the skin 4 (four) times daily (after meals and at bedtime)        I have spent a total time of 42 minutes on 06/06/24 in caring for this patient including greater than 50% of this time was spent in counseling/coordination of care as listed above.       Discussed with the  patient and all questioned fully answered. He will contact me with concerns.    Asif Martines

## 2024-06-20 ENCOUNTER — TELEPHONE (OUTPATIENT)
Age: 59
End: 2024-06-20

## 2024-06-20 DIAGNOSIS — E11.65 TYPE 2 DIABETES MELLITUS WITH HYPERGLYCEMIA, WITH LONG-TERM CURRENT USE OF INSULIN (HCC): ICD-10-CM

## 2024-06-20 DIAGNOSIS — Z79.4 TYPE 2 DIABETES MELLITUS WITH HYPERGLYCEMIA, WITH LONG-TERM CURRENT USE OF INSULIN (HCC): ICD-10-CM

## 2024-06-20 NOTE — TELEPHONE ENCOUNTER
PA for ozempic     Submitted via    []CMM-KEY    [x]Swish-Case ID # [id number]  []Faxed to plan   []Other website    []Phone call Case ID #      Office notes sent, clinical questions answered. Awaiting determination    Turnaround time for your insurance to make a decision on your Prior Authorization can take 7-21 business days.

## 2024-06-25 ENCOUNTER — TELEPHONE (OUTPATIENT)
Dept: ENDOCRINOLOGY | Facility: CLINIC | Age: 59
End: 2024-06-25

## 2024-06-25 NOTE — TELEPHONE ENCOUNTER
----- Message from Asif Martines MD sent at 6/20/2024  9:23 AM EDT -----  Please follow up on Rx for ozempic, we previosly provided sample for Ozempic 0.5mg which ran out but next dose still not available. Can prior auth be expedited?  Contact pt in case he has to call and expedite. Thanks.

## 2024-06-28 NOTE — TELEPHONE ENCOUNTER
PA for OZEMPIC 1 MG Approved     Date(s) approved 06/20/24-06/2025    Case #[id number]    Patient advised by          [x] Conspirehart Message  [] Phone call   []LMOM  []L/M to call office as no active Communication consent on file  []Unable to leave detailed message as VM not approved on Communication consent       Pharmacy advised by    [x]Fax  []Phone call    Approval letter scanned into Media Yes

## 2024-07-12 ENCOUNTER — DOCUMENTATION (OUTPATIENT)
Dept: ENDOCRINOLOGY | Facility: CLINIC | Age: 59
End: 2024-07-12

## 2024-07-12 DIAGNOSIS — E11.65 TYPE 2 DIABETES MELLITUS WITH HYPERGLYCEMIA, WITH LONG-TERM CURRENT USE OF INSULIN (HCC): Primary | ICD-10-CM

## 2024-07-12 DIAGNOSIS — Z79.4 TYPE 2 DIABETES MELLITUS WITH HYPERGLYCEMIA, WITH LONG-TERM CURRENT USE OF INSULIN (HCC): Primary | ICD-10-CM

## 2024-08-05 ENCOUNTER — OFFICE VISIT (OUTPATIENT)
Dept: INTERNAL MEDICINE CLINIC | Facility: CLINIC | Age: 59
End: 2024-08-05
Payer: COMMERCIAL

## 2024-08-05 VITALS
BODY MASS INDEX: 28.04 KG/M2 | DIASTOLIC BLOOD PRESSURE: 94 MMHG | SYSTOLIC BLOOD PRESSURE: 147 MMHG | HEART RATE: 90 BPM | OXYGEN SATURATION: 96 % | WEIGHT: 207 LBS | TEMPERATURE: 98.1 F | HEIGHT: 72 IN

## 2024-08-05 DIAGNOSIS — E78.1 HYPERTRIGLYCERIDEMIA: ICD-10-CM

## 2024-08-05 DIAGNOSIS — E78.5 HYPERLIPIDEMIA, UNSPECIFIED HYPERLIPIDEMIA TYPE: ICD-10-CM

## 2024-08-05 DIAGNOSIS — E11.9 DIABETES (HCC): ICD-10-CM

## 2024-08-05 DIAGNOSIS — I10 ESSENTIAL HYPERTENSION: ICD-10-CM

## 2024-08-05 DIAGNOSIS — E78.5 HYPERLIPIDEMIA: Primary | ICD-10-CM

## 2024-08-05 DIAGNOSIS — Z79.4 TYPE 2 DIABETES MELLITUS WITHOUT COMPLICATION, WITH LONG-TERM CURRENT USE OF INSULIN (HCC): ICD-10-CM

## 2024-08-05 DIAGNOSIS — E11.9 TYPE 2 DIABETES MELLITUS WITHOUT COMPLICATION, WITH LONG-TERM CURRENT USE OF INSULIN (HCC): ICD-10-CM

## 2024-08-05 PROCEDURE — 99214 OFFICE O/P EST MOD 30 MIN: CPT | Performed by: INTERNAL MEDICINE

## 2024-08-05 RX ORDER — TELMISARTAN 20 MG/1
20 TABLET ORAL DAILY
Qty: 90 TABLET | Refills: 2 | Status: SHIPPED | OUTPATIENT
Start: 2024-08-05

## 2024-08-05 RX ORDER — ICOSAPENT ETHYL 1 G/1
1 CAPSULE ORAL 2 TIMES DAILY
Qty: 180 CAPSULE | Refills: 0 | Status: SHIPPED | OUTPATIENT
Start: 2024-08-05 | End: 2024-11-03

## 2024-08-05 RX ORDER — ATORVASTATIN CALCIUM 20 MG/1
20 TABLET, FILM COATED ORAL
Qty: 90 TABLET | Refills: 2 | Status: SHIPPED | OUTPATIENT
Start: 2024-08-05

## 2024-08-05 RX ORDER — ACYCLOVIR 400 MG/1
1 TABLET ORAL
Qty: 9 EACH | Refills: 2 | Status: SHIPPED | OUTPATIENT
Start: 2024-08-05

## 2024-08-05 NOTE — PROGRESS NOTES
Ambulatory Visit  Name: Chanda Mccoy      : 1965      MRN: [id number]  Encounter Provider: Joao Goncalves MD  Encounter Date: 2024   Encounter department: [facility] INTERNAL MEDICINE [facility] [place]    Assessment & Plan   1. Hyperlipidemia  -     Lipid panel; Future  2. Diabetes (HCC)  -     Comprehensive metabolic panel; Future  3. Hyperlipidemia, unspecified hyperlipidemia type  -     atorvastatin (LIPITOR) 20 mg tablet; Take 1 tablet (20 mg total) by mouth daily at bedtime  4. Essential hypertension  -     telmisartan (MICARDIS) 20 MG tablet; Take 1 tablet (20 mg total) by mouth daily  5. Type 2 diabetes mellitus without complication, with long-term current use of insulin ([facility])  -     Continuous Glucose Sensor (Dexcom G7 Sensor); Use 1 Device every 10 days  6. Hypertriglyceridemia  -     Icosapent Ethyl (Vascepa) 1 g CAPS; Take 1 capsule (1 g total) by mouth 2 (two) times a day       Preventive health issues were discussed with patient, and age appropriate screening tests were ordered as noted in patient's After Visit Summary. Personalized health advice and appropriate referrals for health education or preventive services given if needed, as noted in patient's After Visit Summary.    History of Present Illness     HPI   Patient Care Team:  Joao Goncalves MD as PCP - General (Internal Medicine)  Roderick Pelayo MD as Endoscopist    Review of Systems   Constitutional: Negative.    HENT:  Negative for dental problem, drooling, ear discharge and ear pain.    Eyes:  Negative for discharge, redness and itching.   Respiratory:  Negative for apnea, cough and wheezing.    Cardiovascular:  Negative for chest pain and palpitations.   Gastrointestinal:  Negative for abdominal pain, blood in stool, diarrhea and vomiting.   Endocrine: Negative for polydipsia, polyphagia and polyuria.   Genitourinary:  Negative for decreased urine volume, dysuria and frequency.   Musculoskeletal:  Negative for  arthralgias, myalgias and neck stiffness.   Skin:  Negative for pallor and wound.   Allergic/Immunologic: Negative for environmental allergies and food allergies.   Neurological:  Negative for facial asymmetry, light-headedness, numbness and headaches.   Hematological:  Negative for adenopathy. Does not bruise/bleed easily.   Psychiatric/Behavioral:  Negative for agitation, behavioral problems and confusion.      Medical History Reviewed by provider this encounter:       Annual Wellness Visit Questionnaire   Chanda is here for his Subsequent Wellness visit. Last Medicare Wellness visit information reviewed, patient interviewed and updates made to the record today.      Health Risk Assessment:   Patient rates overall health as good. Patient feels that their physical health rating is slightly better. Patient is satisfied with their life. Eyesight was rated as same. Hearing was rated as same. Patient feels that their emotional and mental health rating is same. Patients states they are sometimes angry. Patient states they are sometimes unusually tired/fatigued. Pain experienced in the last 7 days has been none. Patient states that he has experienced no weight loss or gain in last 6 months.     Fall Risk Screening:   In the past year, patient has experienced: no history of falling in past year      Home Safety:  Patient does not have trouble with stairs inside or outside of their home. Patient has working smoke alarms and has working carbon monoxide detector. Home safety hazards include: none.     Nutrition:   Current diet is Diabetic.     Medications:   Patient is currently taking over-the-counter supplements. OTC medications include: see medication list. Patient is able to manage medications.     Activities of Daily Living (ADLs)/Instrumental Activities of Daily Living (IADLs):   Walk and transfer into and out of bed and chair?: Yes  Dress and groom yourself?: Yes    Bathe or shower yourself?: Yes    Feed yourself?  Yes  Do your laundry/housekeeping?: Yes  Manage your money, pay your bills and track your expenses?: Yes  Make your own meals?: Yes    Do your own shopping?: Yes    Previous Hospitalizations:   Any hospitalizations or ED visits within the last 12 months?: No      Advance Care Planning:   Living will: No    Durable POA for healthcare: Yes    Advanced directive: Yes    ACP document given: Yes      PREVENTIVE SCREENINGS      Cardiovascular Screening:    General: Screening Not Indicated and History Lipid Disorder      Diabetes Screening:     General: Screening Not Indicated and History Diabetes      Colorectal Cancer Screening:     General: Screening Current      Prostate Cancer Screening:    General: Screening Current      Lung Cancer Screening:     General: Screening Not Indicated    Screening, Brief Intervention, and Referral to Treatment (SBIRT)    Screening  Typical number of drinks in a day: 0  Typical number of drinks in a week: 0  Interpretation: Low risk drinking behavior.    Single Item Drug Screening:  How often have you used an illegal drug (including marijuana) or a prescription medication for non-medical reasons in the past year? never    Single Item Drug Screen Score: 0  Interpretation: Negative screen for possible drug use disorder    Social Determinants of Health     Food Insecurity: No Food Insecurity (8/5/2024)    Hunger Vital Sign    • Worried About Running Out of Food in the Last Year: Never true    • Ran Out of Food in the Last Year: Never true   Transportation Needs: No Transportation Needs (8/5/2024)    PRAPARE - Transportation    • Lack of Transportation (Medical): No    • Lack of Transportation (Non-Medical): No   Housing Stability: Low Risk  (8/5/2024)    Housing Stability Vital Sign    • Unable to Pay for Housing in the Last Year: No    • Number of Times Moved in the Last Year: 0    • Homeless in the Last Year: No   Utilities: Not At Risk (8/5/2024)    [facility] Utilities    • Threatened with loss  of utilities: No     No results found.    Objective     /94 (BP Location: Left arm, Patient Position: Sitting, Cuff Size: Large)   Pulse 90   Temp 98.1 °F (36.7 °C) (Temporal)   Ht 6' (1.829 m)   Wt 93.9 kg (207 lb)   SpO2 96%   BMI 28.07 kg/m²     Physical Exam  Vitals and nursing note reviewed.   Constitutional:       General: He is not in acute distress.     Appearance: He is well-developed.   HENT:      Head: Normocephalic and atraumatic.   Eyes:      Conjunctiva/sclera: Conjunctivae normal.   Cardiovascular:      Rate and Rhythm: Normal rate and regular rhythm.      Heart sounds: No murmur heard.  Pulmonary:      Effort: Pulmonary effort is normal. No respiratory distress.      Breath sounds: Normal breath sounds.   Abdominal:      Palpations: Abdomen is soft.      Tenderness: There is no abdominal tenderness.   Musculoskeletal:         General: No swelling.      Cervical back: Neck supple.   Skin:     General: Skin is warm and dry.      Capillary Refill: Capillary refill takes less than 2 seconds.   Neurological:      Mental Status: He is alert.   Psychiatric:         Mood and Affect: Mood normal.     Follow up  6 months    Diabetes  well controlled  Hemoglobin A1c  ordered  Hyperlipidemia   Well  controlled  Lipid panel  ordered.    Foot exam  performed    Joao Goncalves MD.FACP

## 2024-08-05 NOTE — PROGRESS NOTES
Diabetic Foot Exam    Patient's shoes and socks removed.    Right Foot/Ankle   Right Foot Inspection  Skin Exam: skin normal, skin intact, dry skin, callus and callus. No warmth, no erythema, no maceration, no abnormal color, no pre-ulcer and no ulcer.     Toe Exam: ROM and strength within normal limits.     Sensory   Vibration: intact  Proprioception: intact  Monofilament testing: intact    Vascular  Capillary refills: elevated  The right DP pulse is 2+. The right PT pulse is 2+.     Left Foot/Ankle  Left Foot Inspection  Skin Exam: skin normal, skin intact, dry skin and callus. No warmth, no erythema, no maceration, normal color, no pre-ulcer and no ulcer.     Toe Exam: ROM and strength within normal limits.     Sensory   Vibration: intact  Proprioception: intact  Monofilament testing: intact    Vascular  Capillary refills: elevated  The left DP pulse is 2+. The left PT pulse is 2+.     Assign Risk Category  No deformity present  No loss of protective sensation  No weak pulses  Risk: 0

## 2024-08-05 NOTE — PROGRESS NOTES
Diabetic Foot Exam    Patient's shoes and socks removed.    Right Foot/Ankle   Right Foot Inspection  Skin Exam: skin normal, skin intact, dry skin, callus and callus. No warmth, no erythema, no maceration, no abnormal color, no pre-ulcer and no ulcer.     Sensory   Monofilament testing: intact    Left Foot/Ankle  Left Foot Inspection  Skin Exam: skin normal, skin intact, dry skin and callus. No warmth, no erythema, no maceration, normal color, no pre-ulcer and no ulcer.     Sensory   Monofilament testing: intact

## 2024-08-05 NOTE — PROGRESS NOTES
Ambulatory Visit  Name: Chanda Mccoy      : 1965      MRN: [id number]  Encounter Provider: Joao Goncalves MD  Encounter Date: 2024   Encounter department: [facility] INTERNAL MEDICINE [facility]    Assessment & Plan   1. Hyperlipidemia  -     Lipid panel; Future  2. Diabetes (HCC)  -     Comprehensive metabolic panel; Future  3. Hyperlipidemia, unspecified hyperlipidemia type  -     atorvastatin (LIPITOR) 20 mg tablet; Take 1 tablet (20 mg total) by mouth daily at bedtime  4. Essential hypertension  -     telmisartan (MICARDIS) 20 MG tablet; Take 1 tablet (20 mg total) by mouth daily  5. Type 2 diabetes mellitus without complication, with long-term current use of insulin ([facility])  -     Continuous Glucose Sensor (Dexcom G7 Sensor); Use 1 Device every 10 days  6. Hypertriglyceridemia  -     Icosapent Ethyl (Vascepa) 1 g CAPS; Take 1 capsule (1 g total) by mouth 2 (two) times a day      Depression Screening and Follow-up Plan: Patient was screened for depression during today's encounter. They screened negative with a PHQ-2 score of 0.      Preventive health issues were discussed with patient, and age appropriate screening tests were ordered as noted in patient's After Visit Summary. Personalized health advice and appropriate referrals for health education or preventive services given if needed, as noted in patient's After Visit Summary.    History of Present Illness     HPI   Patient Care Team:  Joao Goncalves MD as PCP - General (Internal Medicine)  Roderick Pelayo MD as Endoscopist    Review of Systems   Constitutional: Negative.    HENT:  Negative for dental problem, drooling, ear discharge and ear pain.    Eyes:  Negative for discharge, redness and itching.   Respiratory:  Negative for apnea, cough and wheezing.    Cardiovascular:  Negative for chest pain and palpitations.   Gastrointestinal:  Negative for abdominal pain, blood in stool, diarrhea and vomiting.   Endocrine:  Negative for polydipsia, polyphagia and polyuria.   Genitourinary:  Negative for decreased urine volume, dysuria and frequency.   Musculoskeletal:  Negative for arthralgias, myalgias and neck stiffness.   Skin:  Negative for pallor and wound.   Allergic/Immunologic: Negative for environmental allergies and food allergies.   Neurological:  Negative for facial asymmetry, light-headedness, numbness and headaches.   Hematological:  Negative for adenopathy. Does not bruise/bleed easily.   Psychiatric/Behavioral:  Negative for agitation, behavioral problems and confusion.      Medical History Reviewed by provider this encounter:       Annual Wellness Visit Questionnaire   Chanda is here for his Subsequent Wellness visit. Last Medicare Wellness visit information reviewed, patient interviewed and updates made to the record today.      Health Risk Assessment:   Patient rates overall health as good. Patient feels that their physical health rating is slightly better. Patient is satisfied with their life. Eyesight was rated as same. Hearing was rated as same. Patient feels that their emotional and mental health rating is same. Patients states they are sometimes angry. Patient states they are sometimes unusually tired/fatigued. Pain experienced in the last 7 days has been none. Patient states that he has experienced no weight loss or gain in last 6 months.     Depression Screening:   PHQ-2 Score: 0      Fall Risk Screening:   In the past year, patient has experienced: no history of falling in past year      Home Safety:  Patient does not have trouble with stairs inside or outside of their home. Patient has working smoke alarms and has working carbon monoxide detector. Home safety hazards include: none.     Nutrition:   Current diet is Diabetic.     Medications:   Patient is currently taking over-the-counter supplements. OTC medications include: see medication list. Patient is able to manage medications.     Activities of Daily  Living (ADLs)/Instrumental Activities of Daily Living (IADLs):   Walk and transfer into and out of bed and chair?: Yes  Dress and groom yourself?: Yes    Bathe or shower yourself?: Yes    Feed yourself? Yes  Do your laundry/housekeeping?: Yes  Manage your money, pay your bills and track your expenses?: Yes  Make your own meals?: Yes    Do your own shopping?: Yes    Previous Hospitalizations:   Any hospitalizations or ED visits within the last 12 months?: No      Advance Care Planning:   Living will: No    Durable POA for healthcare: Yes    Advanced directive: Yes    ACP document given: Yes      PREVENTIVE SCREENINGS      Cardiovascular Screening:    General: Screening Not Indicated and History Lipid Disorder      Diabetes Screening:     General: Screening Not Indicated and History Diabetes      Colorectal Cancer Screening:     General: Screening Current      Prostate Cancer Screening:    General: Screening Current      Lung Cancer Screening:     General: Screening Not Indicated    Screening, Brief Intervention, and Referral to Treatment (SBIRT)    Screening  Typical number of drinks in a day: 0  Typical number of drinks in a week: 0  Interpretation: Low risk drinking behavior.    Single Item Drug Screening:  How often have you used an illegal drug (including marijuana) or a prescription medication for non-medical reasons in the past year? never    Single Item Drug Screen Score: 0  Interpretation: Negative screen for possible drug use disorder    Social Determinants of Health     Food Insecurity: No Food Insecurity (8/5/2024)    Hunger Vital Sign    • Worried About Running Out of Food in the Last Year: Never true    • Ran Out of Food in the Last Year: Never true   Transportation Needs: No Transportation Needs (8/5/2024)    PRAPARE - Transportation    • Lack of Transportation (Medical): No    • Lack of Transportation (Non-Medical): No   Housing Stability: Low Risk  (8/5/2024)    Housing Stability Vital Sign    •  Unable to Pay for Housing in the Last Year: No    • Number of Times Moved in the Last Year: 0    • Homeless in the Last Year: No   Utilities: Not At Risk (8/5/2024)    [facility] Utilities    • Threatened with loss of utilities: No     No results found.    Objective     /94 (BP Location: Left arm, Patient Position: Sitting, Cuff Size: Large)   Pulse 90   Temp 98.1 °F (36.7 °C) (Temporal)   Ht 6' (1.829 m)   Wt 93.9 kg (207 lb)   SpO2 96%   BMI 28.07 kg/m²     Physical Exam  Vitals and nursing note reviewed.   Constitutional:       General: He is not in acute distress.     Appearance: He is well-developed.   HENT:      Head: Normocephalic and atraumatic.   Eyes:      Conjunctiva/sclera: Conjunctivae normal.   Cardiovascular:      Rate and Rhythm: Normal rate and regular rhythm.      Heart sounds: No murmur heard.  Pulmonary:      Effort: Pulmonary effort is normal. No respiratory distress.      Breath sounds: Normal breath sounds.   Abdominal:      Palpations: Abdomen is soft.      Tenderness: There is no abdominal tenderness.   Musculoskeletal:         General: No swelling.      Cervical back: Neck supple.   Skin:     General: Skin is warm and dry.      Capillary Refill: Capillary refill takes less than 2 seconds.   Neurological:      Mental Status: He is alert.   Psychiatric:         Mood and Affect: Mood normal.     Follow up  6 months    Diabetes  well controlled  Hemoglobin A1c  ordered  Hyperlipidemia   Well  controlled  Lipid panel  ordered.    Foot exam  performed    Joao Goncalves MD.FACP

## 2024-08-05 NOTE — PROGRESS NOTES
Ambulatory Visit  Name: Chanda Mccoy      : 1965      MRN: [id number]  Encounter Provider: Joao Goncalves MD  Encounter Date: 2024   Encounter department: [facility] INTERNAL MEDICINE [facility]    Assessment & Plan   {There are no diagnoses linked to this encounter. (Refresh or delete this SmartLink)}     Preventive health issues were discussed with patient, and age appropriate screening tests were ordered as noted in patient's After Visit Summary. Personalized health advice and appropriate referrals for health education or preventive services given if needed, as noted in patient's After Visit Summary.    History of Present Illness   {Disappearing Hyperlinks I Encounters * My Last Note * Since Last Visit * History :14193}  HPI   Patient Care Team:  Joao Goncalves MD as PCP - General (Internal Medicine)  Roderick Pelayo MD as Endoscopist    Review of Systems  Medical History Reviewed by provider this encounter:       Annual Wellness Visit Questionnaire   Chanda is here for his Subsequent Wellness visit. Last Medicare Wellness visit information reviewed, patient interviewed and updates made to the record today.      Health Risk Assessment:   Patient rates overall health as good. Patient feels that their physical health rating is slightly better. Patient is satisfied with their life. Eyesight was rated as same. Hearing was rated as same. Patient feels that their emotional and mental health rating is same. Patients states they are sometimes angry. Patient states they are sometimes unusually tired/fatigued. Pain experienced in the last 7 days has been none. Patient states that he has experienced no weight loss or gain in last 6 months.     Fall Risk Screening:   In the past year, patient has experienced: no history of falling in past year      Home Safety:  Patient does not have trouble with stairs inside or outside of their home. Patient has working smoke alarms and has working  carbon monoxide detector. Home safety hazards include: none.     Nutrition:   Current diet is Diabetic.     Medications:   Patient is currently taking over-the-counter supplements. OTC medications include: see medication list. Patient is able to manage medications.     Activities of Daily Living (ADLs)/Instrumental Activities of Daily Living (IADLs):   Walk and transfer into and out of bed and chair?: Yes  Dress and groom yourself?: Yes    Bathe or shower yourself?: Yes    Feed yourself? Yes  Do your laundry/housekeeping?: Yes  Manage your money, pay your bills and track your expenses?: Yes  Make your own meals?: Yes    Do your own shopping?: Yes    Previous Hospitalizations:   Any hospitalizations or ED visits within the last 12 months?: No      Advance Care Planning:   Living will: No    Durable POA for healthcare: Yes    Advanced directive: Yes    ACP document given: Yes      PREVENTIVE SCREENINGS      Cardiovascular Screening:    General: Screening Not Indicated and History Lipid Disorder      Diabetes Screening:     General: Screening Not Indicated and History Diabetes      Colorectal Cancer Screening:     General: Screening Current      Prostate Cancer Screening:    General: Screening Current      Lung Cancer Screening:     General: Screening Not Indicated    Screening, Brief Intervention, and Referral to Treatment (SBIRT)    Screening  Typical number of drinks in a day: 0  Typical number of drinks in a week: 0  Interpretation: Low risk drinking behavior.    Single Item Drug Screening:  How often have you used an illegal drug (including marijuana) or a prescription medication for non-medical reasons in the past year? never    Single Item Drug Screen Score: 0  Interpretation: Negative screen for possible drug use disorder       No results found.    Objective   {Disappearing Hyperlinks   Review Vitals * Enter New Vitals * Results Review * Labs * Imaging * Cardiology * Procedures * Lung Cancer Screening  :14019}  /94 (BP Location: Left arm, Patient Position: Sitting, Cuff Size: Large)   Pulse 90   Temp 98.1 °F (36.7 °C) (Temporal)   Ht 6' (1.829 m)   Wt 93.9 kg (207 lb)   SpO2 96%   BMI 28.07 kg/m²     Physical Exam  {Administrative / Billing Section (Optional):10276}

## 2024-08-05 NOTE — PROGRESS NOTES
Ambulatory Visit  Name: Chanda Mccoy      : 1965      MRN: [id number]  Encounter Provider: Joao Goncalves MD  Encounter Date: 2024   [facility]    Assessment & Plan   1. Hyperlipidemia  -     Lipid panel; Future  2. Diabetes (HCC)  -     Comprehensive metabolic panel; Future  3. Hyperlipidemia, unspecified hyperlipidemia type  -     atorvastatin (LIPITOR) 20 mg tablet; Take 1 tablet (20 mg total) by mouth daily at bedtime  4. Essential hypertension  -     telmisartan (MICARDIS) 20 MG tablet; Take 1 tablet (20 mg total) by mouth daily  5. Type 2 diabetes mellitus without complication, with long-term current use of insulin ([facility])  -     Continuous Glucose Sensor (Dexcom G7 Sensor); Use 1 Device every 10 days  6. Hypertriglyceridemia  -     Icosapent Ethyl (Vascepa) 1 g CAPS; Take 1 capsule (1 g total) by mouth 2 (two) times a day      Depression Screening and Follow-up Plan: Patient was screened for depression during today's encounter. They screened negative with a PHQ-2 score of 0.      Preventive health issues were discussed with patient, and age appropriate screening tests were ordered as noted in patient's After Visit Summary. Personalized health advice and appropriate referrals for health education or preventive services given if needed, as noted in patient's After Visit Summary.    History of Present Illness     HPI   Patient Care Team:  Joao Goncalves MD as PCP - General (Internal Medicine)  Roderick Pelayo MD as Endoscopist    Review of Systems   Constitutional: Negative.    HENT:  Negative for dental problem, drooling, ear discharge and ear pain.    Eyes:  Negative for discharge, redness and itching.   Respiratory:  Negative for apnea, cough and wheezing.    Cardiovascular:  Negative for chest pain and palpitations.   Gastrointestinal:  Negative for abdominal pain, blood in stool, diarrhea and vomiting.   Endocrine:  Negative for polydipsia, polyphagia and polyuria.   Genitourinary:  Negative for decreased urine volume, dysuria and frequency.   Musculoskeletal:  Negative for arthralgias, myalgias and neck stiffness.   Skin:  Negative for pallor and wound.   Allergic/Immunologic: Negative for environmental allergies and food allergies.   Neurological:  Negative for facial asymmetry, light-headedness, numbness and headaches.   Hematological:  Negative for adenopathy. Does not bruise/bleed easily.   Psychiatric/Behavioral:  Negative for agitation, behavioral problems and confusion.      Medical History Reviewed by provider this encounter:       Annual Wellness Visit Questionnaire   Chanda is here for his Subsequent Wellness visit. Last Medicare Wellness visit information reviewed, patient interviewed and updates made to the record today.      Health Risk Assessment:   Patient rates overall health as good. Patient feels that their physical health rating is slightly better. Patient is satisfied with their life. Eyesight was rated as same. Hearing was rated as same. Patient feels that their emotional and mental health rating is same. Patients states they are sometimes angry. Patient states they are sometimes unusually tired/fatigued. Pain experienced in the last 7 days has been none. Patient states that he has experienced no weight loss or gain in last 6 months.     Depression Screening:   PHQ-2 Score: 0      Fall Risk Screening:   In the past year, patient has experienced: no history of falling in past year      Home Safety:  Patient does not have trouble with stairs inside or outside of their home. Patient has working smoke alarms and has working carbon monoxide detector. Home safety hazards include: none.     Nutrition:   Current diet is Diabetic.     Medications:   Patient is currently taking over-the-counter supplements. OTC medications include: see medication list. Patient is able to manage medications.     Activities of Daily  Living (ADLs)/Instrumental Activities of Daily Living (IADLs):   Walk and transfer into and out of bed and chair?: Yes  Dress and groom yourself?: Yes    Bathe or shower yourself?: Yes    Feed yourself? Yes  Do your laundry/housekeeping?: Yes  Manage your money, pay your bills and track your expenses?: Yes  Make your own meals?: Yes    Do your own shopping?: Yes    Previous Hospitalizations:   Any hospitalizations or ED visits within the last 12 months?: No      Advance Care Planning:   Living will: No    Durable POA for healthcare: Yes    Advanced directive: Yes    ACP document given: Yes      PREVENTIVE SCREENINGS      Cardiovascular Screening:    General: Screening Not Indicated and History Lipid Disorder      Diabetes Screening:     General: Screening Not Indicated and History Diabetes      Colorectal Cancer Screening:     General: Screening Current      Prostate Cancer Screening:    General: Screening Current      Lung Cancer Screening:     General: Screening Not Indicated    Screening, Brief Intervention, and Referral to Treatment (SBIRT)    Screening  Typical number of drinks in a day: 0  Typical number of drinks in a week: 0  Interpretation: Low risk drinking behavior.    Single Item Drug Screening:  How often have you used an illegal drug (including marijuana) or a prescription medication for non-medical reasons in the past year? never    Single Item Drug Screen Score: 0  Interpretation: Negative screen for possible drug use disorder    Social Determinants of Health     Food Insecurity: No Food Insecurity (8/5/2024)    Hunger Vital Sign    • Worried About Running Out of Food in the Last Year: Never true    • Ran Out of Food in the Last Year: Never true   Transportation Needs: No Transportation Needs (8/5/2024)    PRAPARE - Transportation    • Lack of Transportation (Medical): No    • Lack of Transportation (Non-Medical): No   Housing Stability: Low Risk  (8/5/2024)    Housing Stability Vital Sign    •  Unable to Pay for Housing in the Last Year: No    • Number of Times Moved in the Last Year: 0    • Homeless in the Last Year: No   Utilities: Not At Risk (8/5/2024)    [facility] Utilities    • Threatened with loss of utilities: No     No results found.    Objective     /94 (BP Location: Left arm, Patient Position: Sitting, Cuff Size: Large)   Pulse 90   Temp 98.1 °F (36.7 °C) (Temporal)   Ht 6' (1.829 m)   Wt 93.9 kg (207 lb)   SpO2 96%   BMI 28.07 kg/m²     Physical Exam  Vitals and nursing note reviewed.   Constitutional:       General: He is not in acute distress.     Appearance: He is well-developed.   HENT:      Head: Normocephalic and atraumatic.   Eyes:      Conjunctiva/sclera: Conjunctivae normal.   Cardiovascular:      Rate and Rhythm: Normal rate and regular rhythm.      Heart sounds: No murmur heard.  Pulmonary:      Effort: Pulmonary effort is normal. No respiratory distress.      Breath sounds: Normal breath sounds.   Abdominal:      Palpations: Abdomen is soft.      Tenderness: There is no abdominal tenderness.   Musculoskeletal:         General: No swelling.      Cervical back: Neck supple.   Skin:     General: Skin is warm and dry.      Capillary Refill: Capillary refill takes less than 2 seconds.   Neurological:      Mental Status: He is alert.   Psychiatric:         Mood and Affect: Mood normal.     Follow up  6 months    Diabetes  well controlled  Hemoglobin A1c  ordered  Hyperlipidemia   Well  controlled  Lipid panel  ordered.    Foot exam  performed    Joao Goncalves MD.FACP

## 2024-08-08 DIAGNOSIS — E11.65 TYPE 2 DIABETES MELLITUS WITH HYPERGLYCEMIA, WITH LONG-TERM CURRENT USE OF INSULIN (HCC): ICD-10-CM

## 2024-08-08 DIAGNOSIS — Z79.4 TYPE 2 DIABETES MELLITUS WITH HYPERGLYCEMIA, WITH LONG-TERM CURRENT USE OF INSULIN (HCC): ICD-10-CM

## 2024-08-09 RX ORDER — INSULIN DEGLUDEC 100 U/ML
40 INJECTION, SOLUTION SUBCUTANEOUS
Qty: 30 ML | Refills: 0 | Status: SHIPPED | OUTPATIENT
Start: 2024-08-09

## 2024-08-09 RX ORDER — INSULIN ASPART 100 [IU]/ML
INJECTION, SOLUTION INTRAVENOUS; SUBCUTANEOUS
Qty: 100 ML | Refills: 0 | Status: SHIPPED | OUTPATIENT
Start: 2024-08-09

## 2024-08-27 ENCOUNTER — APPOINTMENT (OUTPATIENT)
Dept: LAB | Facility: CLINIC | Age: 59
End: 2024-08-27
Payer: COMMERCIAL

## 2024-08-27 DIAGNOSIS — E78.5 HYPERLIPIDEMIA: ICD-10-CM

## 2024-08-27 DIAGNOSIS — E11.9 DIABETES (HCC): ICD-10-CM

## 2024-08-27 DIAGNOSIS — Z00.8 OTHER SPECIFIED GENERAL MEDICAL EXAMINATION: ICD-10-CM

## 2024-08-27 LAB
ALBUMIN SERPL BCG-MCNC: 4.2 G/DL (ref 3.5–5)
ALP SERPL-CCNC: 49 U/L (ref 34–104)
ALT SERPL W P-5'-P-CCNC: 24 U/L (ref 7–52)
ANION GAP SERPL CALCULATED.3IONS-SCNC: 7 MMOL/L (ref 4–13)
AST SERPL W P-5'-P-CCNC: 17 U/L (ref 13–39)
BILIRUB SERPL-MCNC: 0.78 MG/DL (ref 0.2–1)
BUN SERPL-MCNC: 18 MG/DL (ref 5–25)
CALCIUM SERPL-MCNC: 9.5 MG/DL (ref 8.4–10.2)
CHLORIDE SERPL-SCNC: 105 MMOL/L (ref 96–108)
CHOLEST SERPL-MCNC: 125 MG/DL
CO2 SERPL-SCNC: 27 MMOL/L (ref 21–32)
CREAT SERPL-MCNC: 1.46 MG/DL (ref 0.6–1.3)
EST. AVERAGE GLUCOSE BLD GHB EST-MCNC: 166 MG/DL
GFR SERPL CREATININE-BSD FRML MDRD: 51 ML/MIN/1.73SQ M
GLUCOSE P FAST SERPL-MCNC: 176 MG/DL (ref 65–99)
HBA1C MFR BLD: 7.4 %
HDLC SERPL-MCNC: 31 MG/DL
LDLC SERPL CALC-MCNC: 55 MG/DL (ref 0–100)
NONHDLC SERPL-MCNC: 94 MG/DL
POTASSIUM SERPL-SCNC: 4.2 MMOL/L (ref 3.5–5.3)
PROT SERPL-MCNC: 7 G/DL (ref 6.4–8.4)
SODIUM SERPL-SCNC: 139 MMOL/L (ref 135–147)
TRIGL SERPL-MCNC: 194 MG/DL

## 2024-08-27 PROCEDURE — 80061 LIPID PANEL: CPT

## 2024-08-27 PROCEDURE — 36415 COLL VENOUS BLD VENIPUNCTURE: CPT

## 2024-08-27 PROCEDURE — 80053 COMPREHEN METABOLIC PANEL: CPT

## 2024-08-27 PROCEDURE — 83036 HEMOGLOBIN GLYCOSYLATED A1C: CPT

## 2024-09-05 ENCOUNTER — DOCUMENTATION (OUTPATIENT)
Dept: PULMONOLOGY | Facility: CLINIC | Age: 59
End: 2024-09-05

## 2024-09-05 DIAGNOSIS — E11.65 TYPE 2 DIABETES MELLITUS WITH HYPERGLYCEMIA, WITH LONG-TERM CURRENT USE OF INSULIN (HCC): Primary | ICD-10-CM

## 2024-09-05 DIAGNOSIS — Z79.4 TYPE 2 DIABETES MELLITUS WITH HYPERGLYCEMIA, WITH LONG-TERM CURRENT USE OF INSULIN (HCC): Primary | ICD-10-CM

## 2024-10-21 ENCOUNTER — TELEPHONE (OUTPATIENT)
Dept: NEPHROLOGY | Facility: CLINIC | Age: 59
End: 2024-10-21

## 2024-10-21 NOTE — TELEPHONE ENCOUNTER
JUVENAL for pt informing him I was able to schedule him for a consult with Dr. Plata 10/29 at 3:00. Asked that he return call [facility] to let us know if it is OK.

## 2024-10-29 ENCOUNTER — CONSULT (OUTPATIENT)
Dept: NEPHROLOGY | Facility: CLINIC | Age: 59
End: 2024-10-29
Payer: COMMERCIAL

## 2024-10-29 VITALS
WEIGHT: 199 LBS | SYSTOLIC BLOOD PRESSURE: 138 MMHG | DIASTOLIC BLOOD PRESSURE: 88 MMHG | BODY MASS INDEX: 26.95 KG/M2 | OXYGEN SATURATION: 95 % | HEART RATE: 110 BPM | HEIGHT: 72 IN

## 2024-10-29 DIAGNOSIS — N18.9 CHRONIC KIDNEY DISEASE-MINERAL AND BONE DISORDER: ICD-10-CM

## 2024-10-29 DIAGNOSIS — N18.31 STAGE 3A CHRONIC KIDNEY DISEASE (HCC): Primary | ICD-10-CM

## 2024-10-29 DIAGNOSIS — E83.9 CHRONIC KIDNEY DISEASE-MINERAL AND BONE DISORDER: ICD-10-CM

## 2024-10-29 DIAGNOSIS — M89.9 CHRONIC KIDNEY DISEASE-MINERAL AND BONE DISORDER: ICD-10-CM

## 2024-10-29 DIAGNOSIS — I10 PRIMARY HYPERTENSION: ICD-10-CM

## 2024-10-29 LAB
SL AMB  POCT GLUCOSE, UA: NORMAL
SL AMB LEUKOCYTE ESTERASE,UA: NORMAL
SL AMB POCT BILIRUBIN,UA: NORMAL
SL AMB POCT BLOOD,UA: NORMAL
SL AMB POCT KETONES,UA: NORMAL
SL AMB POCT NITRITE,UA: NORMAL
SL AMB POCT PH,UA: 6
SL AMB POCT SPECIFIC GRAVITY,UA: 1.02
SL AMB POCT URINE PROTEIN: NORMAL
SL AMB POCT UROBILINOGEN: 0.2

## 2024-10-29 PROCEDURE — 99244 OFF/OP CNSLTJ NEW/EST MOD 40: CPT | Performed by: INTERNAL MEDICINE

## 2024-10-29 PROCEDURE — 81002 URINALYSIS NONAUTO W/O SCOPE: CPT | Performed by: INTERNAL MEDICINE

## 2024-10-29 RX ORDER — VITAMIN K2 90 MCG
CAPSULE ORAL DAILY
COMMUNITY

## 2024-10-29 RX ORDER — METOPROLOL SUCCINATE 25 MG/1
25 TABLET, EXTENDED RELEASE ORAL DAILY
Qty: 90 TABLET | Refills: 1 | Status: SHIPPED | OUTPATIENT
Start: 2024-10-29

## 2024-10-29 NOTE — PROGRESS NOTES
NEPHROLOGY OUTPATIENT CONSULTATION   Chanda Mccoy 59 y.o. male MRN: [id number]  Date: 10/29/24  Reason for consultation: Initial evaluation of CKD    ASSESSMENT and PLAN:  Chronic kidney disease, stage III:  Baseline creatinine is around 1.2 to 1.5.   Etiology for CKD is felt to be arterio and arteriolosclerosis and diabetic kidney disease.   Most recent SCr is 1.46 from August 2024.   Urinalysis is bland. No significant proteinuria to indicate glomerular disease.   Renal imaging from 2022 showed no obstruction.   Treatment consists of controlling modifiable risk factors (good blood pressure, glycemic and lipid control) and avoidance of nephrotoxic medications in order to slow down progression of renal disease.  Check repeat labs in Feb or March 2025.     Hypertension  BP is above goal (<130/80)  Rx: Telmisartan 20 mg OD.   Given history of tachycardia, will add Metoprolol succinate 25 mg OD.   In the future, options would be to increase Telmisartan or Metoprolol if BP remains uncontrolled.     Mineral bone disease  Check PTH, Mg, Vitamin D.     History of tachycardia  Start Metoprolol succinate 25 mg daily as above.     Patient Instructions   Please start Metoprolol succinate 25 mg daily.   Go for labs sometime in March or Feb 2025.   Follow up around 6 months.     HISTORY OF PRESENT ILLNESS:  Requesting Physician: Joao Goncalves MD    Chanda Mccoy is a 59 y.o. male who was referred for initial evaluation of chronic kidney disease.    James has a history of diabetes mellitus, hyperlipidemia, tachycardia, JENNY, reactive airway disease, hepatic steatosis, and nephrolithiasis.  He is now being referred for evaluation of chronic kidney disease.      Based on my review of the records, I note that his creatinine was 1.20 back in September 2019.  Over the course of the past 5 years, his creatinine has ranged between 1.2 and 1.5 for the most part. His creatinine was as high as 1.66 in April 2022 although the  specifics of this event are not available. During routine labs in 2024, his creatinine was noted to have gone up from 1.38 in May 2024 to 1.46 in August 2024 prompting renal consultation.    His urine ACR has always been normal since 2014 and was only up to 33 mg/g in May 2024.  Hemoglobin A1c readings have been 7s to 8s since 2014.  Highest hemoglobin A1c was 8.5% in Jan 2017.  Most recent hemoglobin A1c is 7.4%. He is on Telmisartan for proteinuria and does not have a history of hypertension.    For his DM, he is currently on Tresiba, NovoLog, and Ozempic.  Ozempic was added about 3 months ago. He has lost about 7 pounds over the past 3 months with being on Ozempic. I recent echocardiogram done in [facility] showed an EF of 60%.    No history of gross hematuria.  No history of frequent UTIs.  No history of heavy NSAID use.    PAST MEDICAL HISTORY:  DM: Diagnosed in 2012. No DM retinopathy. He was previously on Metformin and Januvia.  He has been insulin-dependent for about 6 years now.  He follows with Dr. Martines.   HLP: He is on atorvastatin and Vascepa.  Tachycardia: Diagnosed in his 20s. Cardiac work up negative.   JENNY: He is on CPAP.  Reactive airway disease: He is on Symbicort. He sees Dr. Turner.   Hepatic steatosis  Nephrolithiasis: 1st episode in 2004. He has had a total of 3 episodes.  He passed a stone spontaneously about 2 months ago.  No previous stone analysis.    No known history of HTN, CAD, CHF, CVA, PAD, thromboembolism, liver disease, GERD, nephrolithiasis, BPH, malignancy, degenerative joint or disc disease, psychiatric disease.    PAST SURGICAL HISTORY:  Appendectomy 1989.     ALLERGIES:  Allergies   Allergen Reactions    Shellfish-Derived Products - Food Allergy Lip Swelling    Jardiance [Empagliflozin] Other (See Comments)     Skin ulcers      SOCIAL HISTORY:  Non smoker  No alcohol use.   No IVDA.     FAMILY HISTORY:  Negative for ESRD.   Siblings with DM.   Father with BPH - alive at 92.    Mother with DM, CAD, PVD - .     MEDICATIONS:    Current Outpatient Medications:     atorvastatin (LIPITOR) 20 mg tablet, Take 1 tablet (20 mg total) by mouth daily at bedtime, Disp: 90 tablet, Rfl: 2    budesonide-formoterol (SYMBICORT) 80-4.5 MCG/ACT inhaler, Inhale 2 puffs 2 (two) times a day Rinse mouth after use., Disp: 30.6 g, Rfl: 2    Continuous Glucose Sensor (Dexcom G7 Sensor), Use 1 Device every 10 days, Disp: 9 each, Rfl: 2    Icosapent Ethyl (Vascepa) 1 g CAPS, Take 1 capsule (1 g total) by mouth 2 (two) times a day, Disp: 180 capsule, Rfl: 0    insulin aspart (NovoLOG FlexPen) 100 UNIT/ML injection pen, Use 20u TIDAC plus 2u/50 above 150mg/dL; max 80u daily, Disp: 100 mL, Rfl: 0    insulin degludec (Tresiba FlexTouch) 100 units/mL injection pen, Inject 40 Units under the skin daily at bedtime, Disp: 30 mL, Rfl: 0    Insulin Pen Needle (BD Pen Needle Anat U/F) 32G X 4 MM MISC, Inject under the skin 4 (four) times daily (after meals and at bedtime), Disp: 200 each, Rfl: 1    multivitamin-iron-minerals-folic acid (CENTRUM) chewable tablet, Chew 1 tablet daily, Disp: , Rfl:     semaglutide, 2 mg/dose, (Ozempic, 2 MG/DOSE,) 8 mg/ mL injection pen, Inject 0.75 mL (2 mg total) under the skin every 7 days, Disp: 9 mL, Rfl: 1    telmisartan (MICARDIS) 20 MG tablet, Take 1 tablet (20 mg total) by mouth daily, Disp: 90 tablet, Rfl: 2    REVIEW OF SYSTEMS:  Review of Systems   Constitutional:  Positive for appetite change. Negative for chills, fatigue and fever.   HENT:  Negative for hearing loss.         No recurrent sinus infections.   Eyes:  Negative for visual disturbance.   Respiratory:  Negative for cough and shortness of breath.    Cardiovascular:  Negative for chest pain, palpitations and leg swelling.   Gastrointestinal:  Negative for abdominal pain, diarrhea, nausea and vomiting.   Genitourinary:  Positive for hematuria (Only during stone passage.). Negative for difficulty urinating and  dysuria.   Musculoskeletal:  Negative for arthralgias and back pain.   Skin:  Negative for rash.   Neurological:  Negative for dizziness and light-headedness.   Hematological:  Does not bruise/bleed easily.   Psychiatric/Behavioral:  Negative for dysphoric mood. The patient is not nervous/anxious.    All the systems were reviewed and were negative except as documented on the HPI.    PHYSICAL EXAMINATION:  /94 (BP Location: Left arm, Patient Position: Sitting, Cuff Size: Standard)   Pulse (!) 110   Ht 6' (1.829 m)   Wt 90.3 kg (199 lb)   SpO2 95%   BMI 26.99 kg/m²   Current Weight:   Body mass index is 26.99 kg/m².  General: conscious, coherent, cooperative, not in distress.   Skin: warm, dry, good turgor.   Eyes: pink conjunctivae, no scleral icterus.   ENT: moist lips and mucous membranes.   Neck: No carotid bruit.   Respiratory: equal chest expansion, fine crackles on the R base.   Cardiovascular: distinct heart sounds, normal rate, regular rhythm, no rub  Abdomen: soft, non-tender, non-distended, normoactive bowel sounds  Extremities: no edema.  Genitourinary: no grimaldo catheter.   Neuro: awake, alert, oriented to time, place and person.   Psych: appropriate affect.     LABORATORY RESULTS:  Lab Results   Component Value Date    WBC 8.55 03/10/2023    HGB 17.1 (H) 03/10/2023    HCT 53.2 (H) 03/10/2023    MCV 87 03/10/2023     03/10/2023     Lab Results   Component Value Date    SODIUM 139 2024    K 4.2 2024     2024    CO2 27 2024    BUN 18 2024    CREATININE 1.46 (H) 2024    GLUC 174 (H) 2017    CALCIUM 9.5 2024     Office UA: Specific gravity 1.020, glucose negative, ketones negative, blood negative, nitrite negative, leukocytes negative, protein trace, pH 6.  Microscopy: No RBCs, no WBCs, few hyaline casts.    IMAGIN22 Abdominal US: R 12.4 cm, L 10.1 cm, bilateral renal cysts.

## 2024-10-29 NOTE — PATIENT INSTRUCTIONS
Please start Metoprolol succinate 25 mg daily.   Go for labs sometime in March or Feb 2025.   Follow up around 6 months.

## 2024-11-01 DIAGNOSIS — E11.65 TYPE 2 DIABETES MELLITUS WITH HYPERGLYCEMIA, WITH LONG-TERM CURRENT USE OF INSULIN (HCC): ICD-10-CM

## 2024-11-01 DIAGNOSIS — Z79.4 TYPE 2 DIABETES MELLITUS WITH HYPERGLYCEMIA, WITH LONG-TERM CURRENT USE OF INSULIN (HCC): ICD-10-CM

## 2024-11-01 RX ORDER — INSULIN DEGLUDEC 100 U/ML
40 INJECTION, SOLUTION SUBCUTANEOUS
Qty: 30 ML | Refills: 0 | Status: SHIPPED | OUTPATIENT
Start: 2024-11-01

## 2025-02-05 DIAGNOSIS — J45.909 ASTHMA: ICD-10-CM

## 2025-02-05 RX ORDER — BUDESONIDE AND FORMOTEROL FUMARATE DIHYDRATE 80; 4.5 UG/1; UG/1
2 AEROSOL RESPIRATORY (INHALATION) 2 TIMES DAILY
Qty: 30.6 G | Refills: 2 | Status: SHIPPED | OUTPATIENT
Start: 2025-02-05

## 2025-02-21 DIAGNOSIS — E11.65 TYPE 2 DIABETES MELLITUS WITH HYPERGLYCEMIA, WITH LONG-TERM CURRENT USE OF INSULIN (HCC): ICD-10-CM

## 2025-02-21 DIAGNOSIS — E78.2 MIXED HYPERLIPIDEMIA: ICD-10-CM

## 2025-02-21 DIAGNOSIS — Z79.4 TYPE 2 DIABETES MELLITUS WITH HYPERGLYCEMIA, WITH LONG-TERM CURRENT USE OF INSULIN (HCC): ICD-10-CM

## 2025-02-21 DIAGNOSIS — I10 PRIMARY HYPERTENSION: ICD-10-CM

## 2025-02-21 DIAGNOSIS — N18.31 STAGE 3A CHRONIC KIDNEY DISEASE (HCC): Primary | ICD-10-CM

## 2025-03-04 ENCOUNTER — APPOINTMENT (OUTPATIENT)
Dept: LAB | Facility: MEDICAL CENTER | Age: 60
End: 2025-03-04
Payer: COMMERCIAL

## 2025-03-04 DIAGNOSIS — E11.65 TYPE 2 DIABETES MELLITUS WITH HYPERGLYCEMIA, WITH LONG-TERM CURRENT USE OF INSULIN (HCC): ICD-10-CM

## 2025-03-04 DIAGNOSIS — Z79.4 TYPE 2 DIABETES MELLITUS WITH HYPERGLYCEMIA, WITH LONG-TERM CURRENT USE OF INSULIN (HCC): ICD-10-CM

## 2025-03-04 DIAGNOSIS — I10 PRIMARY HYPERTENSION: ICD-10-CM

## 2025-03-04 DIAGNOSIS — E78.2 MIXED HYPERLIPIDEMIA: ICD-10-CM

## 2025-03-04 DIAGNOSIS — N18.31 STAGE 3A CHRONIC KIDNEY DISEASE (HCC): ICD-10-CM

## 2025-03-04 LAB
25(OH)D3 SERPL-MCNC: 40 NG/ML (ref 30–100)
ALBUMIN SERPL BCG-MCNC: 4.5 G/DL (ref 3.5–5)
ALP SERPL-CCNC: 60 U/L (ref 34–104)
ALT SERPL W P-5'-P-CCNC: 27 U/L (ref 7–52)
ANION GAP SERPL CALCULATED.3IONS-SCNC: 11 MMOL/L (ref 4–13)
AST SERPL W P-5'-P-CCNC: 19 U/L (ref 13–39)
BACTERIA UR QL AUTO: ABNORMAL /HPF
BILIRUB SERPL-MCNC: 0.83 MG/DL (ref 0.2–1)
BILIRUB UR QL STRIP: NEGATIVE
BUN SERPL-MCNC: 15 MG/DL (ref 5–25)
CALCIUM SERPL-MCNC: 9.7 MG/DL (ref 8.4–10.2)
CHLORIDE SERPL-SCNC: 104 MMOL/L (ref 96–108)
CHOLEST SERPL-MCNC: 136 MG/DL (ref ?–200)
CLARITY UR: CLEAR
CO2 SERPL-SCNC: 27 MMOL/L (ref 21–32)
COLOR UR: ABNORMAL
CREAT SERPL-MCNC: 1.29 MG/DL (ref 0.6–1.3)
CREAT UR-MCNC: 153.2 MG/DL
EST. AVERAGE GLUCOSE BLD GHB EST-MCNC: 166 MG/DL
GFR SERPL CREATININE-BSD FRML MDRD: 59 ML/MIN/1.73SQ M
GLUCOSE P FAST SERPL-MCNC: 168 MG/DL (ref 65–99)
GLUCOSE UR STRIP-MCNC: NEGATIVE MG/DL
HBA1C MFR BLD: 7.4 %
HDLC SERPL-MCNC: 33 MG/DL
HGB UR QL STRIP.AUTO: NEGATIVE
KETONES UR STRIP-MCNC: NEGATIVE MG/DL
LDLC SERPL CALC-MCNC: 52 MG/DL (ref 0–100)
LEUKOCYTE ESTERASE UR QL STRIP: NEGATIVE
MAGNESIUM SERPL-MCNC: 1.8 MG/DL (ref 1.9–2.7)
MICROALBUMIN UR-MCNC: 19.7 MG/L
MICROALBUMIN/CREAT 24H UR: 13 MG/G CREATININE (ref 0–30)
MUCOUS THREADS UR QL AUTO: ABNORMAL
NITRITE UR QL STRIP: NEGATIVE
NON-SQ EPI CELLS URNS QL MICRO: ABNORMAL /HPF
NONHDLC SERPL-MCNC: 103 MG/DL
PH UR STRIP.AUTO: 6.5 [PH]
PHOSPHATE SERPL-MCNC: 3.1 MG/DL (ref 2.3–4.1)
POTASSIUM SERPL-SCNC: 4.3 MMOL/L (ref 3.5–5.3)
PROT SERPL-MCNC: 7.4 G/DL (ref 6.4–8.4)
PROT UR STRIP-MCNC: ABNORMAL MG/DL
PTH-INTACT SERPL-MCNC: 61.7 PG/ML (ref 12–88)
RBC #/AREA URNS AUTO: ABNORMAL /HPF
SODIUM SERPL-SCNC: 142 MMOL/L (ref 135–147)
SP GR UR STRIP.AUTO: 1.02 (ref 1–1.03)
TRIGL SERPL-MCNC: 255 MG/DL (ref ?–150)
UROBILINOGEN UR STRIP-ACNC: <2 MG/DL
WBC #/AREA URNS AUTO: ABNORMAL /HPF

## 2025-03-04 PROCEDURE — 83036 HEMOGLOBIN GLYCOSYLATED A1C: CPT

## 2025-03-04 PROCEDURE — 36415 COLL VENOUS BLD VENIPUNCTURE: CPT

## 2025-03-04 PROCEDURE — 82570 ASSAY OF URINE CREATININE: CPT

## 2025-03-04 PROCEDURE — 80061 LIPID PANEL: CPT

## 2025-03-04 PROCEDURE — 84100 ASSAY OF PHOSPHORUS: CPT

## 2025-03-04 PROCEDURE — 81001 URINALYSIS AUTO W/SCOPE: CPT

## 2025-03-04 PROCEDURE — 83735 ASSAY OF MAGNESIUM: CPT

## 2025-03-04 PROCEDURE — 82043 UR ALBUMIN QUANTITATIVE: CPT

## 2025-03-04 PROCEDURE — 83970 ASSAY OF PARATHORMONE: CPT

## 2025-03-04 PROCEDURE — 80053 COMPREHEN METABOLIC PANEL: CPT

## 2025-03-04 PROCEDURE — 82306 VITAMIN D 25 HYDROXY: CPT

## 2025-03-07 ENCOUNTER — OFFICE VISIT (OUTPATIENT)
Dept: INTERNAL MEDICINE CLINIC | Facility: CLINIC | Age: 60
End: 2025-03-07
Payer: COMMERCIAL

## 2025-03-07 VITALS
TEMPERATURE: 98.7 F | HEART RATE: 100 BPM | OXYGEN SATURATION: 94 % | DIASTOLIC BLOOD PRESSURE: 88 MMHG | WEIGHT: 193 LBS | BODY MASS INDEX: 26.14 KG/M2 | SYSTOLIC BLOOD PRESSURE: 122 MMHG | HEIGHT: 72 IN

## 2025-03-07 DIAGNOSIS — E78.1 HYPERTRIGLYCERIDEMIA: ICD-10-CM

## 2025-03-07 DIAGNOSIS — Z79.4 TYPE 2 DIABETES MELLITUS WITHOUT COMPLICATION, WITH LONG-TERM CURRENT USE OF INSULIN (HCC): Primary | ICD-10-CM

## 2025-03-07 DIAGNOSIS — Z12.5 SCREENING FOR PROSTATE CANCER: ICD-10-CM

## 2025-03-07 DIAGNOSIS — E11.9 TYPE 2 DIABETES MELLITUS WITHOUT COMPLICATION, WITH LONG-TERM CURRENT USE OF INSULIN (HCC): Primary | ICD-10-CM

## 2025-03-07 PROCEDURE — 99213 OFFICE O/P EST LOW 20 MIN: CPT | Performed by: INTERNAL MEDICINE

## 2025-03-07 RX ORDER — ACYCLOVIR 400 MG/1
1 TABLET ORAL
Qty: 9 EACH | Refills: 3 | Status: SHIPPED | OUTPATIENT
Start: 2025-03-07

## 2025-03-07 RX ORDER — ICOSAPENT ETHYL 1 G/1
1 CAPSULE ORAL 2 TIMES DAILY
Qty: 180 CAPSULE | Refills: 0 | Status: SHIPPED | OUTPATIENT
Start: 2025-03-07 | End: 2025-06-05

## 2025-03-07 NOTE — PROGRESS NOTES
Assessment/Plan:    Office  follow up  of DIabetes, Hyperlipidemia, Hypertension      Diagnoses and all orders for this visit:    Type 2 diabetes mellitus without complication, with long-term current use of insulin (HCC)  -     Continuous Glucose Sensor (Dexcom G7 Sensor); Use 1 Device every 10 days  -     Ambulatory Referral to Ophthalmology; Future  -     Hemoglobin A1C; Future  -     Lipid panel; Future  -     Comprehensive metabolic panel; Future    Hypertriglyceridemia  -     Icosapent Ethyl (Vascepa) 1 g CAPS; Take 1 capsule (1 g total) by mouth 2 (two) times a day    Screening for prostate cancer  -     PSA, Total Screen; Future          Subjective: No  complaints     Patient ID: Chanda Mccoy is a 59 y.o. male.    HPI    The following portions of the patient's history were reviewed and updated as appropriate: allergies, current medications, past family history, past medical history, past social history, past surgical history, and problem list.    Review of Systems   Constitutional: Negative.    HENT:  Negative for dental problem, drooling, ear discharge and ear pain.    Eyes:  Negative for discharge, redness and itching.   Respiratory:  Negative for apnea, cough and wheezing.    Cardiovascular:  Negative for chest pain and palpitations.   Gastrointestinal:  Negative for abdominal pain, blood in stool, diarrhea and vomiting.   Endocrine: Negative for polydipsia, polyphagia and polyuria.   Genitourinary:  Negative for decreased urine volume, dysuria and frequency.   Musculoskeletal:  Negative for arthralgias, myalgias and neck stiffness.   Skin:  Negative for pallor and wound.   Allergic/Immunologic: Negative for environmental allergies and food allergies.   Neurological:  Negative for facial asymmetry, light-headedness, numbness and headaches.   Hematological:  Negative for adenopathy. Does not bruise/bleed easily.   Psychiatric/Behavioral:  Negative for agitation, behavioral problems and confusion.           Objective:      /88 (BP Location: Left arm, Patient Position: Sitting, Cuff Size: Standard)   Pulse 100   Temp 98.7 °F (37.1 °C) (Temporal)   Ht 6' (1.829 m)   Wt 87.5 kg (193 lb)   SpO2 94%   BMI 26.18 kg/m²          Physical Exam  Constitutional:       Appearance: Normal appearance.   HENT:      Head: Normocephalic.      Nose: Nose normal.      Mouth/Throat:      Mouth: Mucous membranes are moist.   Eyes:      Pupils: Pupils are equal, round, and reactive to light.   Cardiovascular:      Rate and Rhythm: Regular rhythm.      Heart sounds: Normal heart sounds.   Pulmonary:      Breath sounds: Normal breath sounds.   Abdominal:      Palpations: Abdomen is soft.   Musculoskeletal:         General: No swelling.      Cervical back: Neck supple.   Skin:     General: Skin is warm.   Neurological:      General: No focal deficit present.      Mental Status: He is alert and oriented to person, place, and time.   Psychiatric:         Mood and Affect: Mood normal.       Diabetes   controlled  on  current meds Hemoglobin A1C  7.4  Continue  same meds  Hyperlipidemia   controlled  with  current meds  Mild  microalbuminuria  from  diabets  Eye  exam for  retina  disease    Fup  4 months.     Joao Goncalves MD.FACP

## 2025-03-20 ENCOUNTER — DOCUMENTATION (OUTPATIENT)
Dept: ENDOCRINOLOGY | Facility: CLINIC | Age: 60
End: 2025-03-20

## 2025-03-20 DIAGNOSIS — E11.65 TYPE 2 DIABETES MELLITUS WITH HYPERGLYCEMIA, WITH LONG-TERM CURRENT USE OF INSULIN (HCC): Primary | ICD-10-CM

## 2025-03-20 DIAGNOSIS — Z79.4 TYPE 2 DIABETES MELLITUS WITH HYPERGLYCEMIA, WITH LONG-TERM CURRENT USE OF INSULIN (HCC): Primary | ICD-10-CM

## 2025-03-31 ENCOUNTER — TELEPHONE (OUTPATIENT)
Age: 60
End: 2025-03-31

## 2025-04-01 ENCOUNTER — OFFICE VISIT (OUTPATIENT)
Age: 60
End: 2025-04-01
Payer: COMMERCIAL

## 2025-04-01 DIAGNOSIS — H43.823 VITREOMACULAR ADHESION OF BOTH EYES: ICD-10-CM

## 2025-04-01 DIAGNOSIS — H25.13 NUCLEAR SCLEROSIS OF BOTH EYES: ICD-10-CM

## 2025-04-01 DIAGNOSIS — E11.9 TYPE 2 DIABETES MELLITUS WITHOUT RETINOPATHY (HCC): Primary | ICD-10-CM

## 2025-04-01 PROCEDURE — 92134 CPTRZ OPH DX IMG PST SGM RTA: CPT | Performed by: OPHTHALMOLOGY

## 2025-04-01 PROCEDURE — 92004 COMPRE OPH EXAM NEW PT 1/>: CPT | Performed by: OPHTHALMOLOGY

## 2025-04-01 PROCEDURE — 92250 FUNDUS PHOTOGRAPHY W/I&R: CPT | Performed by: OPHTHALMOLOGY

## 2025-04-01 NOTE — PROGRESS NOTES
Name: Chanda Mccoy      : 1965      MRN: [id number]  Encounter Provider: Esther Mueller MD  Encounter Date: 2025   Encounter department: [facility] OPHTHALMOLOGY  :  Assessment & Plan  Type 2 diabetes mellitus without retinopathy (HCC)    Lab Results   Component Value Date    HGBA1C 7.4 (H) 2025   No diabetic retinopathy on exam, no macular edema, no NV. The importance of proper blood sugar, blood lipids, and blood pressure control for minimizing the risk of vision loss due to retinopathy associated with diabetes mellitus and hypertension was discussed with the patient. Stressed the importance of following up for monitoring and management by a primary care physician.       Orders:    OCT, Retina - OU - Both Eyes    Fundus Photos - OU - Both Eyes    Nuclear sclerosis of both eyes  Not visually significant at this time. Continue to monitor; Pt denies any difficulty with glare or decrease in vision.         Vitreomacular adhesion of both eyes  Pt has syneresis; No retinal tears, breaks, or detachments on dilated examination; Recommend monitoring; Retinal detachment precautions were discussed with the patient. The patient was instructed to call or return immediately for an increase in flashes of light, floaters (dark spots in vision), or curtaining of the visual field.                  Chanda Mccoy is a 59 y.o. male who presents today for Diabetic eye exam. Pt Is type 2 diabetic for 20 years. He reports Vision is stable. BS was 124 today and last ha1c was 7.4. His last eye exam was last year  History obtained from: patient    Review of Systems  Medical History Reviewed by provider this encounter:  Tobacco  Allergies  Meds  Problems  Med Hx  Surg Hx  Fam Hx     .     Past Ocular History: None  Ocular Meds/Drops: None    Base Eye Exam       Visual Acuity (Snellen - Linear)         Right Left    Dist cc 20/20 20/20              Tonometry (Tonopen, 10:41 AM)         Right Left     Pressure 17 16              Pupils         Dark APD    Right 3 -    Left 3 -              Visual Fields         Left Right     Full Full              Extraocular Movement         Right Left     Full, Ortho Full, Ortho              Neuro/Psych       Oriented x3: Yes              Dilation       Both eyes: 2.5% Phenylephrine, 1% Tropicamide @ 10:42 AM                  Slit Lamp and Fundus Exam       External Exam         Right Left    External Normal Normal              Slit Lamp Exam         Right Left    Lids/Lashes Normal Normal    Conjunctiva/Sclera White and quiet White and quiet    Cornea Clear Clear    Anterior Chamber Deep and quiet Deep and quiet    Iris Round and reactive Round and reactive    Lens trace NSC trace NSC    Anterior Vitreous No PVD, clear, no cell No PVD, clear, no cell              Fundus Exam         Right Left    Disc sharp, no pallor sharp, no pallor    C/D Ratio 0.25 0.25    Macula RPE changes RPE changes    Vessels normal in caliber normal in caliber    Periphery flat, no retinal tear or detachment flat, no retinal tear or detachment; one dot heme                      IMAGING:  OCT, Retina - OU - Both Eyes          Right Eye  Quality was good. Findings include (Vitreomacular adhesion).     Left Eye  Quality was good. Findings include (Vitreomacular adhesion).          Fundus Photos - OU - Both Eyes          Right Eye  Disc findings include normal observations. Macula findings include normal observations. Vessel findings include normal observations. Periphery findings include normal observations.     Left Eye  Disc findings include normal observations. Macula findings include normal observations. Vessel findings include normal observations. Periphery findings include normal observations.

## 2025-04-29 ENCOUNTER — OFFICE VISIT (OUTPATIENT)
Dept: DERMATOLOGY | Facility: CLINIC | Age: 60
End: 2025-04-29
Payer: COMMERCIAL

## 2025-04-29 VITALS — BODY MASS INDEX: 26.68 KG/M2 | TEMPERATURE: 97.6 F | HEIGHT: 72 IN | WEIGHT: 197 LBS

## 2025-04-29 DIAGNOSIS — D48.5 NEOPLASM OF UNCERTAIN BEHAVIOR OF SKIN: Primary | ICD-10-CM

## 2025-04-29 PROCEDURE — 99214 OFFICE O/P EST MOD 30 MIN: CPT | Performed by: DERMATOLOGY

## 2025-04-29 PROCEDURE — 88305 TISSUE EXAM BY PATHOLOGIST: CPT | Performed by: PATHOLOGY

## 2025-04-29 PROCEDURE — 11102 TANGNTL BX SKIN SINGLE LES: CPT | Performed by: DERMATOLOGY

## 2025-04-29 NOTE — PATIENT INSTRUCTIONS
"  NEOPLASM OF UNCERTAIN BEHAVIOR OF SKIN      Assessment and Plan:  I have discussed with the patient that a sample of skin via a \"skin biopsy” would be potentially helpful to further make a specific diagnosis under the microscope.  Based on a thorough discussion of this condition and the management approach to it (including a comprehensive discussion of the known risks, side effects and potential benefits of treatment), the patient (family) agrees to implement the following specific plan:    Procedure:  Skin Biopsy.  After a thorough discussion of treatment options and risk/benefits/alternatives (including but not limited to local pain, scarring, dyspigmentation, blistering, possible superinfection, and inability to confirm a diagnosis via histopathology), verbal and written consent were obtained and portion of the rash was biopsied for tissue sample.  See below for consent that was obtained from patient and subsequent Procedure Note.  INFORMED CONSENT DISCUSSION AND POST-OPERATIVE INSTRUCTIONS FOR PATIENT    I.  RATIONALE FOR PROCEDURE  I understand that a skin biopsy allows the Dermatologist to test a lesion or rash under the microscope to obtain a diagnosis.  It usually involves numbing the area with numbing medication and removing a small piece of skin; sometimes the area will be closed with sutures. In this specific procedure, sutures are not usually needed.  If any sutures are placed, then they are usually need to be removed in 2 weeks or less.    I understand that my Dermatologist recommends that a skin \"shave\" biopsy be performed today.  A local anesthetic, similar to the kind that a dentist uses when filling a cavity, will be injected with a very small needle into the skin area to be sampled.  The injected skin and tissue underneath \"will go to sleep” and become numb so no pain should be felt afterwards.  An instrument shaped like a tiny \"razor blade\" (shave biopsy instrument) will be used to cut a small " "piece of tissue and skin from the area so that a sample of tissue can be taken and examined more closely under the microscope.  A slight amount of bleeding will occur, but it will be stopped with direct pressure and a pressure bandage and any other appropriate methods.  I understands that a scar will form where the wound was created.  Surgical ointment will be applied to help protect the wound.  Sutures are not usually needed.    II.  RISKS AND POTENTIAL COMPLICATIONS   I understand the risks and potential complications of a skin biopsy include but are not limited to the following:  Bleeding  Infection  Pain  Scar/keloid  Skin discoloration  Incomplete Removal  Recurrence  Nerve Damage/Numbness/Loss of Function  Allergic Reaction to Anesthesia  Biopsies are diagnostic procedures and based on findings additional treatment or evaluation may be required  Loss or destruction of specimen resulting in no additional findings    My Dermatologist has explained to me the nature of the condition, the nature of the procedure, and the benefits to be reasonably expected compared with alternative approaches.  My Dermatologist has discussed the likelihood of major risks or complications of this procedure including the specific risks listed above, such as bleeding, infection, and scarring/keloid.  I understand that a scar is expected after this procedure.  I understand that my physician cannot predict if the scar will form a \"keloid,\" which extends beyond the borders of the wound that is created.  A keloid is a thick, painful, and bumpy scar.  A keloid can be difficult to treat, as it does not always respond well to therapy, which includes injecting cortisone directly into the keloid every few weeks.  While this usually reduces the pain and size of the scar, it does not eliminate it.      I understand that photographs may be taken before and after the procedure.  These will be maintained as part of the medical providers confidential " "records and may not be made available to me.  I further authorize the medical provider to use the photographs for teaching purposes or to illustrate scientific papers, books, or lectures if in his/her judgment, medical research, education, or science may benefit from its use.    I have had an opportunity to fully inquire about the risks and benefits of this procedure and its alternatives.   I have been given ample time and opportunity to ask questions and to seek a second opinion if I wished to do so.  I acknowledge that there have specifically been no guarantees as to the cosmetic results from the procedure.  I am aware that with any procedure there is always the possibility of an unexpected complication.    III. POST-PROCEDURAL CARE (WHAT YOU WILL NEED TO DO \"AFTER THE BIOPSY\" TO OPTIMIZE HEALING)    Keep the area clean and dry.  Try NOT to remove the bandage or get it wet for the first 24 hours.    Gently clean the area and apply surgical ointment (such as Vaseline petrolatum ointment, which is available \"over the counter\" and not a prescription) to the biopsy site for up to 2 weeks straight.  This acts to protect the wound from the outside world.      Sutures are not usually placed in this procedure.  If any sutures were placed, return for suture removal as instructed (generally 1 week for the face, 2 weeks for the body).      Take Acetaminophen (Tylenol) for discomfort, if no contraindications.  Ibuprofen or aspirin could make bleeding worse.    Call our office immediately for signs of infection: fever, chills, increased redness, warmth, tenderness, discomfort/pain, or pus or foul smell coming from the wound.    WHAT TO DO IF THERE IS ANY BLEEDING?  If a small amount of bleeding is noticed, place a clean cloth over the area and apply firm pressure for ten minutes.  Check the wound after 10 minutes of direct pressure.  If bleeding persists, try one more time for an additional 10 minutes of direct pressure on the " area.  If the bleeding becomes heavier or does not stop after the second attempt, or if you have any other questions about this procedure, then please call your [facility] Dermatologist by calling [phone] (SKIN).     I hereby acknowledge that I have reviewed and verified the site with my Dermatologist and have requested and authorized my Dermatologist to proceed with the procedure.

## 2025-04-29 NOTE — PROGRESS NOTES
"[facility] Note     Patient Name: Chanda Mccoy  Encounter Date: 4/29/25       Have you been cared for by a [facility] Dermatologist in the last 3 years and, if so, which description applies to you? NO. I am considered a \"new\" patient and must complete all patient intake questions. I am not of child-bearing potential.     REVIEW OF SYSTEMS:  Have you recently had or currently have any of the following? Recent fever or chills? No  Any non-healing wound? YES, see below   PAST MEDICAL HISTORY:  Have you personally ever had or currently have any of the following?  If \"YES,\" then please provide more detail. Skin cancer (such as Melanoma, Basal Cell Carcinoma, Squamous Cell Carcinoma?  No  Tuberculosis, HIV/AIDS, Hepatitis B or C: No  Radiation Treatment No   HISTORY OF IMMUNOSUPPRESSION:   Do you have a history of any of the following:  Systemic Immunosuppression such as Diabetes, Biologic or Immunotherapy, Chemotherapy, Organ Transplantation, Bone Marrow Transplantation or Prednisone?  YES, diabetes     Answering \"YES\" requires the addition of the dotphrase \"IMMUNOSUPPRESSED\" as the first diagnosis of the patient's visit.   FAMILY HISTORY:  Any \"first degree relatives\" (parent, brother, sister, or child) with the following?    Skin Cancer, Pancreatic or Other Cancer? No   PATIENT EXPERIENCE:    Do you want the Dermatologist to perform a COMPLETE skin exam today including a clinical examination under the \"bra and underwear\" areas?  NO  If necessary, do we have your permission to call and leave a detailed message on your Preferred Phone number that includes your specific medical information?  Yes      Allergies   Allergen Reactions    Shellfish-Derived Products - Food Allergy Lip Swelling    Jardiance [Empagliflozin] Other (See Comments)     Skin ulcers       Current Outpatient Medications:     atorvastatin (LIPITOR) 20 mg tablet, Take 1 tablet (20 mg total) by mouth daily at bedtime, Disp: 90 " tablet, Rfl: 2    budesonide-formoterol (SYMBICORT) 80-4.5 MCG/ACT inhaler, Inhale 2 puffs 2 (two) times a day Rinse mouth after use., Disp: 30.6 g, Rfl: 2    Cholecalciferol (Vitamin D3) 1000 units CAPS, Take by mouth in the morning, Disp: , Rfl:     Continuous Glucose Sensor (Dexcom G7 Sensor), Use 1 Device every 10 days, Disp: 9 each, Rfl: 3    Icosapent Ethyl (Vascepa) 1 g CAPS, Take 1 capsule (1 g total) by mouth 2 (two) times a day, Disp: 180 capsule, Rfl: 0    insulin aspart (NovoLOG FlexPen) 100 UNIT/ML injection pen, Use 20u TIDAC plus 2u/50 above 150mg/dL; max 80u daily, Disp: 100 mL, Rfl: 0    Insulin Pen Needle (BD Pen Needle Anat U/F) 32G X 4 MM MISC, Inject under the skin 4 (four) times daily (after meals and at bedtime), Disp: 200 each, Rfl: 1    metoprolol succinate (TOPROL-XL) 25 mg 24 hr tablet, Take 1 tablet (25 mg total) by mouth daily, Disp: 90 tablet, Rfl: 1    multivitamin-iron-minerals-folic acid (CENTRUM) chewable tablet, Chew 1 tablet daily, Disp: , Rfl:     semaglutide, 2 mg/dose, (Ozempic, 2 MG/DOSE,) 8 mg/ mL injection pen, Inject 0.75 mL (2 mg total) under the skin every 7 days, Disp: 9 mL, Rfl: 1    telmisartan (MICARDIS) 20 MG tablet, Take 1 tablet (20 mg total) by mouth daily, Disp: 90 tablet, Rfl: 2    Tresiba FlexTouch 100 units/mL injection pen, Inject 40 Units under the skin daily at bedtime (Patient not taking: Reported on 4/1/2025), Disp: 30 mL, Rfl: 0              Whom besides the patient is providing clinical information about today's encounter?   NO ADDITIONAL HISTORIAN (patient alone provided history)    Physical Exam and Assessment/Plan by Diagnosis:      NEOPLASM OF UNCERTAIN BEHAVIOR OF SKIN    Physical Exam:  (Anatomic Location); (Size and Morphological Description); (Differential Diagnosis):  Specimen A; right nare; 5 mm pedunculated skin colored papule: ddx: verruca r/o scc  Pertinent Positives:  Pertinent Negatives:    Additional History of Present Condition:   "Patient has growth inside right nasal ala present for 6 months. It has grown in size and occasionally bleeds. It is not itchy or tender. Patient has a hyperpigmented patch on his back that has been growing but is asymptomatic otherwise. Patient also reports warts have started to grow back on his face that were previously treated in 8/2023.    Assessment and Plan:  I have discussed with the patient that a sample of skin via a \"skin biopsy” would be potentially helpful to further make a specific diagnosis under the microscope.  Based on a thorough discussion of this condition and the management approach to it (including a comprehensive discussion of the known risks, side effects and potential benefits of treatment), the patient (family) agrees to implement the following specific plan:    Procedure:  Skin Biopsy.  After a thorough discussion of treatment options and risk/benefits/alternatives (including but not limited to local pain, scarring, dyspigmentation, blistering, possible superinfection, and inability to confirm a diagnosis via histopathology), verbal and written consent were obtained and portion of the rash was biopsied for tissue sample.  See below for consent that was obtained from patient and subsequent Procedure Note.  PROCEDURE SNIP BIOPSY NOTE:    Performing Physician:   Anatomic Location; Clinical Description with size (cm); Pre-Op Diagnosis:   Specimen A; right nare; 5 mm pedunculated skin colored papule: ddx: verruca r/o scc  Post-op diagnosis: Same     Local anesthesia: 1% xylocaine with epi      Topical anesthesia: None    Hemostasis: Aluminum chloride       After obtaining informed consent  at which time there was a discussion about the purpose of biopsy  and low risks of infection and bleeding.  The area was prepped and draped in the usual fashion. Anesthesia was obtained with 1% lidocaine with epinephrine. A shave biopsy to an appropriate sampling depth was obtained by Shave (Dermablade " "or 15 blade) The resulting wound was covered with surgical ointment and bandaged appropriately.     The patient tolerated the procedure well without complications and was without signs of functional compromise.      Specimen has been sent for review by Dermatopathology.    Standard post-procedure care has been explained and has been included in written form within the patient's copy of Informed Consent.    INFORMED CONSENT DISCUSSION AND POST-OPERATIVE INSTRUCTIONS FOR PATIENT    I.  RATIONALE FOR PROCEDURE  I understand that a skin biopsy allows the Dermatologist to test a lesion or rash under the microscope to obtain a diagnosis.  It usually involves numbing the area with numbing medication and removing a small piece of skin; sometimes the area will be closed with sutures. In this specific procedure, sutures are not usually needed.  If any sutures are placed, then they are usually need to be removed in 2 weeks or less.    I understand that my Dermatologist recommends that a skin \"shave\" biopsy be performed today.  A local anesthetic, similar to the kind that a dentist uses when filling a cavity, will be injected with a very small needle into the skin area to be sampled.  The injected skin and tissue underneath \"will go to sleep” and become numb so no pain should be felt afterwards.  An instrument shaped like a tiny \"razor blade\" (shave biopsy instrument) will be used to cut a small piece of tissue and skin from the area so that a sample of tissue can be taken and examined more closely under the microscope.  A slight amount of bleeding will occur, but it will be stopped with direct pressure and a pressure bandage and any other appropriate methods.  I understands that a scar will form where the wound was created.  Surgical ointment will be applied to help protect the wound.  Sutures are not usually needed.    II.  RISKS AND POTENTIAL COMPLICATIONS   I understand the risks and potential complications of a skin biopsy " "include but are not limited to the following:  Bleeding  Infection  Pain  Scar/keloid  Skin discoloration  Incomplete Removal  Recurrence  Nerve Damage/Numbness/Loss of Function  Allergic Reaction to Anesthesia  Biopsies are diagnostic procedures and based on findings additional treatment or evaluation may be required  Loss or destruction of specimen resulting in no additional findings    My Dermatologist has explained to me the nature of the condition, the nature of the procedure, and the benefits to be reasonably expected compared with alternative approaches.  My Dermatologist has discussed the likelihood of major risks or complications of this procedure including the specific risks listed above, such as bleeding, infection, and scarring/keloid.  I understand that a scar is expected after this procedure.  I understand that my physician cannot predict if the scar will form a \"keloid,\" which extends beyond the borders of the wound that is created.  A keloid is a thick, painful, and bumpy scar.  A keloid can be difficult to treat, as it does not always respond well to therapy, which includes injecting cortisone directly into the keloid every few weeks.  While this usually reduces the pain and size of the scar, it does not eliminate it.      I understand that photographs may be taken before and after the procedure.  These will be maintained as part of the medical providers confidential records and may not be made available to me.  I further authorize the medical provider to use the photographs for teaching purposes or to illustrate scientific papers, books, or lectures if in his/her judgment, medical research, education, or science may benefit from its use.    I have had an opportunity to fully inquire about the risks and benefits of this procedure and its alternatives.   I have been given ample time and opportunity to ask questions and to seek a second opinion if I wished to do so.  I acknowledge that there have " "specifically been no guarantees as to the cosmetic results from the procedure.  I am aware that with any procedure there is always the possibility of an unexpected complication.    III. POST-PROCEDURAL CARE (WHAT YOU WILL NEED TO DO \"AFTER THE BIOPSY\" TO OPTIMIZE HEALING)    Keep the area clean and dry.  Try NOT to remove the bandage or get it wet for the first 24 hours.    Gently clean the area and apply surgical ointment (such as Vaseline petrolatum ointment, which is available \"over the counter\" and not a prescription) to the biopsy site for up to 2 weeks straight.  This acts to protect the wound from the outside world.      Sutures are not usually placed in this procedure.  If any sutures were placed, return for suture removal as instructed (generally 1 week for the face, 2 weeks for the body).      Take Acetaminophen (Tylenol) for discomfort, if no contraindications.  Ibuprofen or aspirin could make bleeding worse.    Call our office immediately for signs of infection: fever, chills, increased redness, warmth, tenderness, discomfort/pain, or pus or foul smell coming from the wound.    WHAT TO DO IF THERE IS ANY BLEEDING?  If a small amount of bleeding is noticed, place a clean cloth over the area and apply firm pressure for ten minutes.  Check the wound after 10 minutes of direct pressure.  If bleeding persists, try one more time for an additional 10 minutes of direct pressure on the area.  If the bleeding becomes heavier or does not stop after the second attempt, or if you have any other questions about this procedure, then please call your [facility] Dermatologist by calling [phone] (SKIN).     I hereby acknowledge that I have reviewed and verified the site with my Dermatologist and have requested and authorized my Dermatologist to proceed with the procedure.      Scribe Attestation      I,:  Jeremias Albarado am acting as a scribe while in the presence of the attending physician.:       I,:  Suzanna Jones MD " personally performed the services described in this documentation    as scribed in my presence.:

## 2025-04-30 ENCOUNTER — TELEPHONE (OUTPATIENT)
Dept: NEPHROLOGY | Facility: CLINIC | Age: 60
End: 2025-04-30

## 2025-04-30 NOTE — TELEPHONE ENCOUNTER
Left msg with pt offering sooner robert with Dr. Plata on 4/30/25 in [place] office if still available.

## 2025-05-02 ENCOUNTER — RESULTS FOLLOW-UP (OUTPATIENT)
Dept: DERMATOLOGY | Facility: CLINIC | Age: 60
End: 2025-05-02

## 2025-05-02 PROCEDURE — 88305 TISSUE EXAM BY PATHOLOGIST: CPT | Performed by: PATHOLOGY

## 2025-05-02 NOTE — RESULT ENCOUNTER NOTE
DERMATOPATHOLOGY RESULT NOTE    Results reviewed by ordering physician.  Called patient to personally discuss results. Sent [employer] message with results       Instructions for Clinical Derm Team:   (remember to route Result Note to appropriate staff):    None    Result & Plan by Specimen:    Specimen A: benign  Plan: reassured, benign, discussed possibility of recurrence, and cryotherapy should lesion recur     Tissue Exam: [id number]  Order: [id number]   Status: Final result      Dx: Neoplasm of uncertain behavior of skin    Test Result Released: Yes (seen)      Messages: Not Seen    View Coordination for this Result      View Follow-Up Encounter     Component  Ref Range & Units (hover)   Case Report  Surgical Pathology Report                         Case: [id number]                                  Authorizing Provider:  Suzanna Jonse MD           Collected:           04/29/2025 0902              Ordering Location:     [facility]      Received:            04/29/2025 0902                                     [place]                                                                      Pathologist:           Preston Saunders MD                                                      Specimen:    Skin, Other, Specimen A; right nare                                                      Final Diagnosis  A. Skin, right nare:  VERRUCA VULGARIS WITH FEATURES OF FIBROEPITHELIAL POLYP, INFLAMED  Electronically signed by Preston Saunders MD on 5/2/2025 at 1235 EDT  Note   Interpretation performed at [facility] [address] [place] [id number]    Additional Information   All reported additional testing was performed with appropriately reactive controls.  These tests were developed and their performance characteristics determined by [facility] or appropriate performing facility, though some tests may be performed on tissues which have not been validated for performance  "characteristics (such as staining performed on alcohol exposed cell blocks and decalcified tissues).  Results should be interpreted with caution and in the context of the patients' clinical condition. These tests may not be cleared or approved by the U.S. Food and Drug Administration, though the FDA has determined that such clearance or approval is not necessary. These tests are used for clinical purposes and they should not be regarded as investigational or for research. This laboratory has been approved by [place] 88, designated as a high-complexity laboratory and is qualified to perform these tests.  .  Gross Description   A. The specimen is received in formalin, labeled with the patient's name and hospital number, and is designated \" skin right nare\".  It consists of a 0.5 x 0.4 x 0.2 cm tan-brown irregular shaped previously disrupted soft tissue fragment.  The specimen is inked green and bisected to reveal a tan-white solid cut surface.  Entirely submitted between sponges in cassette A1.    Note: The estimated total formalin fixation time based upon information provided by the submitting clinician and the standard processing schedule is under 72 hours.  [facility]  Clinical Information   ATTENTION:  DERMPATH GROUP    SPECIMEN A; Skin; Anatomic Location: right nare; Procedure/Protocol: Skin Specimen (submit in FORMALIN):Tangential Biopsy (includes shave, scoop, saucerization, curette) (CPT 76729; each additional tangential biopsy is CPT 29623)  59 y.o. year old  Male with a Morphological Description:5 mm pedunculated skin colored papule  Differential Diagnosis and/or Specific Clinical Question:verruca r/o SCC  Resulting Agency [facility]          Specimen Collected: 04/29/25  9:02 AM Last Resulted: 05/02/25 12:35 PM        "

## 2025-05-20 ENCOUNTER — OFFICE VISIT (OUTPATIENT)
Dept: NEPHROLOGY | Facility: CLINIC | Age: 60
End: 2025-05-20
Payer: COMMERCIAL

## 2025-05-20 VITALS
BODY MASS INDEX: 27.5 KG/M2 | DIASTOLIC BLOOD PRESSURE: 80 MMHG | HEIGHT: 72 IN | WEIGHT: 203 LBS | HEART RATE: 70 BPM | SYSTOLIC BLOOD PRESSURE: 134 MMHG | OXYGEN SATURATION: 96 %

## 2025-05-20 DIAGNOSIS — N18.30 TYPE 2 DM WITH CKD STAGE 3 AND HYPERTENSION (HCC): Primary | ICD-10-CM

## 2025-05-20 DIAGNOSIS — N18.31 STAGE 3A CHRONIC KIDNEY DISEASE (HCC): ICD-10-CM

## 2025-05-20 DIAGNOSIS — E11.22 TYPE 2 DM WITH CKD STAGE 3 AND HYPERTENSION (HCC): Primary | ICD-10-CM

## 2025-05-20 DIAGNOSIS — I12.9 TYPE 2 DM WITH CKD STAGE 3 AND HYPERTENSION (HCC): Primary | ICD-10-CM

## 2025-05-20 DIAGNOSIS — N18.9 CHRONIC KIDNEY DISEASE-MINERAL AND BONE DISORDER: ICD-10-CM

## 2025-05-20 DIAGNOSIS — I12.9 BENIGN HYPERTENSION WITH CHRONIC KIDNEY DISEASE: ICD-10-CM

## 2025-05-20 DIAGNOSIS — E83.9 CHRONIC KIDNEY DISEASE-MINERAL AND BONE DISORDER: ICD-10-CM

## 2025-05-20 DIAGNOSIS — M89.9 CHRONIC KIDNEY DISEASE-MINERAL AND BONE DISORDER: ICD-10-CM

## 2025-05-20 PROCEDURE — 99214 OFFICE O/P EST MOD 30 MIN: CPT | Performed by: INTERNAL MEDICINE

## 2025-05-20 RX ORDER — METOPROLOL SUCCINATE 50 MG/1
50 TABLET, EXTENDED RELEASE ORAL DAILY
Qty: 90 TABLET | Refills: 2 | Status: SHIPPED | OUTPATIENT
Start: 2025-05-20 | End: 2025-05-20 | Stop reason: SDUPTHER

## 2025-05-20 RX ORDER — METOPROLOL SUCCINATE 50 MG/1
50 TABLET, EXTENDED RELEASE ORAL DAILY
Qty: 90 TABLET | Refills: 2 | Status: SHIPPED | OUTPATIENT
Start: 2025-05-20

## 2025-05-20 RX ORDER — TELMISARTAN 20 MG/1
20 TABLET ORAL DAILY
Qty: 90 TABLET | Refills: 2 | Status: SHIPPED | OUTPATIENT
Start: 2025-05-20

## 2025-05-20 NOTE — PROGRESS NOTES
NEPHROLOGY OFFICE PROGRESS NOTE   Chanda Mccoy 59 y.o. male MRN: [id number]  DATE: 05/20/25  Reason for visit: Continued evaluation and management of CKD    Assessment & Plan  Stage 3a chronic kidney disease (HCC)  Baseline creatinine is around 1.2 to 1.5.   Etiology for CKD is felt to be arterio and arteriolosclerosis and diabetic kidney disease.   Stable renal function - SCr 1.29.   Treatment: good BP and glycemic control.     Benign hypertension with chronic kidney disease  BP is above goal (<130/80)  Increase Metoprolol succ to 50 mg OD.   Continue Telmisartan at 20 mg OD.     Chronic kidney disease-mineral and bone disorder  PTH and Vitamin D at goal.   Ca, Mg, Phos at goal.   Continue with current medications (see med list below).     Type 2 DM with CKD stage 3 and hypertension (HCC)  DM is being managed by Dr. Goncalves.  He is not on an SGLT2i - may consider this if glycemic control remains above goal.         Patient Instructions   You have chronic kidney disease (CKD) and your kidney function is stable.   Please increase Metoprolol succinate to 50 mg daily.   Follow up in 12 months - please obtain blood work 1-2 weeks prior to the appointment.     Please check your BP twice daily for 1 week and bring a log with your next visit.   Please avoid taking NSAIDs (Ibuprofen, Motrin, Aleve, Advil, Naproxen, Celebrex, etc.)  Make sure you are eating healthy and have adequate exercise.       SUBJECTIVE / INTERVAL HISTORY:  James was last seen in the office in October 2024.   Since then, no hospital stays or ER visits.   He stopped the Ozempic due to abdominal pain.   His abdominal pain is better since stopping Ozempic.   He is considering Zepbound.   No new acute issues.   Home BP has been in the mid 130s/80s. He checks 3 times per week.     BP meds/diuretics:  Telmisartan 20 mg OD.   Metoprolol succ 25 mg OD.     Other renal pertinent meds:  Vitamin D 1000 units OD.     PMH/PSH: HTN, DM, HLP, tachycardia, JENNY,  reactive airway disease, hepatic steatosis, nephrolithiasis, appendectomy 1989.     Previous work up:   5/17/22 Abdominal US: R 12.4 cm, L 10.1 cm, bilateral renal cysts.     ALLERGIES: Allergies[1]    REVIEW OF SYSTEMS:  Review of Systems   Constitutional:  Negative for chills and fever.   Respiratory:  Negative for cough and shortness of breath.    Cardiovascular:  Negative for chest pain and leg swelling.   Gastrointestinal:  Positive for abdominal pain (resolved since stopping Ozempic.). Negative for diarrhea, nausea and vomiting.   Genitourinary:  Negative for hematuria.   Allergic/Immunologic: Negative for immunocompromised state.   Neurological:  Negative for dizziness and light-headedness.     OBJECTIVE:  /80 (BP Location: Left arm, Patient Position: Sitting, Cuff Size: Standard)   Pulse 70   Ht 6' (1.829 m)   Wt 92.1 kg (203 lb)   SpO2 96%   BMI 27.53 kg/m²   Current Weight:   Body mass index is 26.72 kg/m².  Physical Exam  Constitutional:       General: He is not in acute distress.     Appearance: Normal appearance. He is not ill-appearing, toxic-appearing or diaphoretic.   HENT:      Head: Normocephalic and atraumatic.     Eyes:      Conjunctiva/sclera: Conjunctivae normal.       Cardiovascular:      Rate and Rhythm: Normal rate and regular rhythm.      Heart sounds: Normal heart sounds.   Pulmonary:      Effort: Pulmonary effort is normal.      Breath sounds: Normal breath sounds.   Abdominal:      General: Bowel sounds are normal.      Palpations: Abdomen is soft.     Musculoskeletal:      Right lower leg: No edema.      Left lower leg: No edema.     Skin:     General: Skin is warm and dry.     Neurological:      Mental Status: He is alert. Mental status is at baseline.     Psychiatric:         Behavior: Behavior normal.         Judgment: Judgment normal.       Medications:Current Medications[2]    Laboratory Results:  Lab Results   Component Value Date    WBC 8.55 03/10/2023    HGB 17.1 (H)  03/10/2023    HCT 53.2 (H) 03/10/2023    MCV 87 03/10/2023     03/10/2023     Lab Results   Component Value Date    SODIUM 142 03/04/2025    K 4.3 03/04/2025     03/04/2025    CO2 27 03/04/2025    BUN 15 03/04/2025    CREATININE 1.29 03/04/2025    GLUC 174 (H) 01/21/2017    CALCIUM 9.7 03/04/2025          [1]   Allergies  Allergen Reactions    Shellfish-Derived Products - Food Allergy Lip Swelling    Jardiance [Empagliflozin] Other (See Comments)     Skin ulcers    [2]   Current Outpatient Medications:     atorvastatin (LIPITOR) 20 mg tablet, Take 1 tablet (20 mg total) by mouth daily at bedtime, Disp: 90 tablet, Rfl: 2    budesonide-formoterol (SYMBICORT) 80-4.5 MCG/ACT inhaler, Inhale 2 puffs 2 (two) times a day Rinse mouth after use., Disp: 30.6 g, Rfl: 2    Cholecalciferol (Vitamin D3) 1000 units CAPS, Take by mouth in the morning, Disp: , Rfl:     Continuous Glucose Sensor (Dexcom G7 Sensor), Use 1 Device every 10 days, Disp: 9 each, Rfl: 3    Icosapent Ethyl (Vascepa) 1 g CAPS, Take 1 capsule (1 g total) by mouth 2 (two) times a day, Disp: 180 capsule, Rfl: 0    insulin aspart (NovoLOG FlexPen) 100 UNIT/ML injection pen, Use 20u TIDAC plus 2u/50 above 150mg/dL; max 80u daily, Disp: 100 mL, Rfl: 0    Insulin Pen Needle (BD Pen Needle Anat U/F) 32G X 4 MM MISC, Inject under the skin 4 (four) times daily (after meals and at bedtime), Disp: 200 each, Rfl: 1    metoprolol succinate (TOPROL-XL) 25 mg 24 hr tablet, Take 1 tablet (25 mg total) by mouth daily, Disp: 90 tablet, Rfl: 1    multivitamin-iron-minerals-folic acid (CENTRUM) chewable tablet, Chew 1 tablet daily, Disp: , Rfl:     semaglutide, 2 mg/dose, (Ozempic, 2 MG/DOSE,) 8 mg/ mL injection pen, Inject 0.75 mL (2 mg total) under the skin every 7 days, Disp: 9 mL, Rfl: 1    telmisartan (MICARDIS) 20 MG tablet, Take 1 tablet (20 mg total) by mouth daily, Disp: 90 tablet, Rfl: 2    Tresiba FlexTouch 100 units/mL injection pen, Inject 40 Units under  the skin daily at bedtime (Patient not taking: Reported on 4/1/2025), Disp: 30 mL, Rfl: 0

## 2025-05-20 NOTE — PATIENT INSTRUCTIONS
You have chronic kidney disease (CKD) and your kidney function is stable.   Please increase Metoprolol succinate to 50 mg daily.   Follow up in 12 months - please obtain blood work 1-2 weeks prior to the appointment.     Please check your BP twice daily for 1 week and bring a log with your next visit.   Please avoid taking NSAIDs (Ibuprofen, Motrin, Aleve, Advil, Naproxen, Celebrex, etc.)  Make sure you are eating healthy and have adequate exercise.

## 2025-05-20 NOTE — ASSESSMENT & PLAN NOTE
Baseline creatinine is around 1.2 to 1.5.   Etiology for CKD is felt to be arterio and arteriolosclerosis and diabetic kidney disease.   Stable renal function - SCr 1.29.   Treatment: good BP and glycemic control.

## 2025-06-27 ENCOUNTER — OFFICE VISIT (OUTPATIENT)
Dept: PULMONOLOGY | Facility: CLINIC | Age: 60
End: 2025-06-27
Payer: COMMERCIAL

## 2025-06-27 VITALS
BODY MASS INDEX: 27.36 KG/M2 | DIASTOLIC BLOOD PRESSURE: 84 MMHG | WEIGHT: 202 LBS | TEMPERATURE: 98 F | SYSTOLIC BLOOD PRESSURE: 120 MMHG | HEIGHT: 72 IN | OXYGEN SATURATION: 98 % | HEART RATE: 78 BPM

## 2025-06-27 DIAGNOSIS — J45.40 MODERATE PERSISTENT ASTHMA WITHOUT COMPLICATION: Primary | ICD-10-CM

## 2025-06-27 DIAGNOSIS — G47.33 OSA (OBSTRUCTIVE SLEEP APNEA): ICD-10-CM

## 2025-06-27 DIAGNOSIS — J45.909 ASTHMA: ICD-10-CM

## 2025-06-27 DIAGNOSIS — I10 PRIMARY HYPERTENSION: ICD-10-CM

## 2025-06-27 PROCEDURE — 99214 OFFICE O/P EST MOD 30 MIN: CPT | Performed by: INTERNAL MEDICINE

## 2025-06-27 RX ORDER — MONTELUKAST SODIUM 10 MG/1
10 TABLET ORAL
Qty: 90 TABLET | Refills: 2 | Status: SHIPPED | OUTPATIENT
Start: 2025-06-27

## 2025-06-27 RX ORDER — BUDESONIDE AND FORMOTEROL FUMARATE DIHYDRATE 80; 4.5 UG/1; UG/1
2 AEROSOL RESPIRATORY (INHALATION) 2 TIMES DAILY
Qty: 30.6 G | Refills: 2 | Status: SHIPPED | OUTPATIENT
Start: 2025-06-27

## 2025-06-27 NOTE — PROGRESS NOTES
Name: Chanda Mccoy      : 1965      MRN: [id number]  Encounter Provider: Pema Rogers MD  Encounter Date: 2025   Encounter department: [facility] [place]  :  Assessment & Plan  Moderate persistent asthma without complication  He has moderate persistent asthma for a long time. Currently his excess tolerance is good and he has occasional cough. He has been on treatment with Symbicort regularly. He has no hoarseness of voice or dysphagia. His chest was clear to auscultation.  His  PFT showed severe airflow obstruction with bronchodilator response consistent with asthma.  He stated that he had mild eosinophilia before.  He has been getting symptoms in spite of using Symbicort 80.  This could be due to seasonal allergies send I have added montelukast.  Our plan will be to increase the Symbicort strength to 160 if he continues to have symptoms.  I had a long discussion with him and answered all his questions.    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  His overnight home sleep study from 2022 showed mild obstructive sleep apnea with significant oxygen desaturation.  He has been subsequently started on CPAP therapy6 to 16 cm of water using a fullface mask.  He is using the CPAP every night and is comfortable with the mask and pressure.  His symptoms have improved significantly.  He is very motivated to continue on CPAP therapy.  His CPAP compliance was satisfactory and his residual AHI was low I have advised him to continue with CPAP therapy as before.  He also had history of polycythemia.  He has recently lost weight Ozempic.  I had a long discussion with him and answered all his questions.        Primary hypertension  He has hypertension. He is  currently on telmisartan. He stated that he developed a cough with the lisinopril and losartan. Also has diabetes. He is on atorvastatin.            History of Present Illness   General  Associated symptoms include a rash (Icthyosis). Pertinent negatives include no abdominal pain, arthralgias, chest pain, chills, coughing, fatigue, fever, headaches, myalgias, nausea, sore throat or vomiting.     Chanda Mccoy is a 59 y.o. male past medical history of asthma obstructive sleep apnea and hypertension who has come for a follow-up.  He has been using the CPAP regularly and is comfortable with the mask and pressure.  He had no significant daytime sleepiness or morning headache.  I reviewed his CPAP compliance data and they were satisfactory.  His residual AHI was low.  He has history of asthma and has been on treatment with Symbicort 80 and albuterol as needed.  During the allergy season he has been having increased symptoms.  He has been using more of albuterol rescue inhaler.  He has shortness of breath and cough with clear phlegm.  No chest pain no palpitation no swelling of feet.  No fever no chills.  Has occasional hoarseness of voice.  He gargles throat after using Symbicort.  No dysphagia.  He has severe allergy symptoms.  He has voluntarily lost weight with Ozempic.    In view of his allergy symptoms we will add montelukast 10 mg at night.      Review of Systems   Constitutional:  Negative for appetite change, chills, fatigue and fever.   HENT:  Positive for postnasal drip, rhinorrhea, sneezing and voice change. Negative for ear pain, hearing loss, sore throat and trouble swallowing.    Respiratory:  Positive for shortness of breath. Negative for cough and wheezing.    Cardiovascular:  Negative for chest pain and palpitations.   Gastrointestinal:  Negative for abdominal pain, constipation, diarrhea, nausea and vomiting.   Genitourinary:  Negative for dysuria, frequency and urgency.   Musculoskeletal:  Negative  for arthralgias, back pain and myalgias.   Skin:  Positive for rash (Icthyosis).   Allergic/Immunologic: Positive for environmental allergies.   Neurological:  Negative for dizziness, syncope, light-headedness and headaches.   Psychiatric/Behavioral:  Negative for agitation, confusion and sleep disturbance.           Objective   /84 (BP Location: Left arm, Patient Position: Sitting, Cuff Size: Standard)   Pulse 78   Temp 98 °F (36.7 °C) (Tympanic)   Ht 6' (1.829 m)   Wt 91.6 kg (202 lb)   SpO2 98%   BMI 27.40 kg/m²      Physical Exam  Vitals reviewed.   Constitutional:       General: He is not in acute distress.     Appearance: He is not ill-appearing, toxic-appearing or diaphoretic.   HENT:      Head: Normocephalic.      Mouth/Throat:      Mouth: Mucous membranes are moist.      Pharynx: Oropharynx is clear.     Eyes:      General: No scleral icterus.     Conjunctiva/sclera: Conjunctivae normal.       Cardiovascular:      Rate and Rhythm: Normal rate and regular rhythm.      Heart sounds: Normal heart sounds. No murmur heard.  Pulmonary:      Effort: Pulmonary effort is normal. No respiratory distress.      Breath sounds: Normal breath sounds. No stridor. No wheezing, rhonchi or rales.   Chest:      Chest wall: No tenderness.   Abdominal:      Palpations: Abdomen is soft.      Tenderness: There is no guarding.     Musculoskeletal:      Cervical back: Neck supple. No rigidity.      Right lower leg: No edema.      Left lower leg: No edema.   Lymphadenopathy:      Cervical: No cervical adenopathy.     Skin:     Coloration: Skin is not jaundiced or pale.      Findings: No rash.     Neurological:      Mental Status: He is alert and oriented to person, place, and time.      Gait: Gait normal.     Psychiatric:         Mood and Affect: Mood normal.         Behavior: Behavior normal.         Thought Content: Thought content normal.         Judgment: Judgment normal.           Answers submitted by the patient for  this visit:  Pulmonology Questionnaire (Submitted on 6/26/2025)  Chief Complaint: Primary symptoms  Chronicity: chronic  When did you first notice your symptoms?: more than 1 year ago  How often do your symptoms occur?: daily  Since you first noticed this problem, how has it changed?: gradually improving  Do you have shortness of breath that occurs with effort or exertion?: No  Do you have ear congestion?: No  Do you have heartburn?: No  Do you have fatigue?: No  Do you have nasal congestion?: Yes  Do you have shortness of breath when lying flat?: No  Do you have shortness of breath when you wake up?: No  Do you have sweats?: No  Have you experienced weight loss?: No  Which of the following makes your symptoms worse?: nothing  Which of the following makes your symptoms better?: rest, steroid inhaler

## 2025-06-27 NOTE — ASSESSMENT & PLAN NOTE
He has moderate persistent asthma for a long time. Currently his excess tolerance is good and he has occasional cough. He has been on treatment with Symbicort regularly. He has no hoarseness of voice or dysphagia. His chest was clear to auscultation.  His  PFT showed severe airflow obstruction with bronchodilator response consistent with asthma.  He stated that he had mild eosinophilia before.  He has been getting symptoms in spite of using Symbicort 80.  This could be due to seasonal allergies send I have added montelukast.  Our plan will be to increase the Symbicort strength to 160 if he continues to have symptoms.  I had a long discussion with him and answered all his questions.    Orders:    montelukast (SINGULAIR) 10 mg tablet; Take 1 tablet (10 mg total) by mouth daily at bedtime

## 2025-06-27 NOTE — ASSESSMENT & PLAN NOTE
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  His overnight home sleep study from 5/2/2022 showed mild obstructive sleep apnea with significant oxygen desaturation.  He has been subsequently started on CPAP therapy6 to 16 cm of water using a fullface mask.  He is using the CPAP every night and is comfortable with the mask and pressure.  His symptoms have improved significantly.  He is very motivated to continue on CPAP therapy.  His CPAP compliance was satisfactory and his residual AHI was low I have advised him to continue with CPAP therapy as before.  He also had history of polycythemia.  He has recently lost weight Ozempic.  I had a long discussion with him and answered all his questions.

## 2025-07-14 ENCOUNTER — DOCUMENTATION (OUTPATIENT)
Dept: ENDOCRINOLOGY | Facility: CLINIC | Age: 60
End: 2025-07-14

## 2025-07-14 DIAGNOSIS — E11.65 TYPE 2 DIABETES MELLITUS WITH HYPERGLYCEMIA, WITH LONG-TERM CURRENT USE OF INSULIN (HCC): Primary | ICD-10-CM

## 2025-07-14 DIAGNOSIS — Z79.4 TYPE 2 DIABETES MELLITUS WITH HYPERGLYCEMIA, WITH LONG-TERM CURRENT USE OF INSULIN (HCC): Primary | ICD-10-CM

## 2025-07-16 DIAGNOSIS — Z79.4 TYPE 2 DIABETES MELLITUS WITH HYPERGLYCEMIA, WITH LONG-TERM CURRENT USE OF INSULIN (HCC): ICD-10-CM

## 2025-07-16 DIAGNOSIS — E11.65 TYPE 2 DIABETES MELLITUS WITH HYPERGLYCEMIA, WITH LONG-TERM CURRENT USE OF INSULIN (HCC): ICD-10-CM

## 2025-07-17 DIAGNOSIS — Z79.4 TYPE 2 DIABETES MELLITUS WITH HYPERGLYCEMIA, WITH LONG-TERM CURRENT USE OF INSULIN (HCC): ICD-10-CM

## 2025-07-17 DIAGNOSIS — E11.65 TYPE 2 DIABETES MELLITUS WITH HYPERGLYCEMIA, WITH LONG-TERM CURRENT USE OF INSULIN (HCC): ICD-10-CM

## 2025-07-17 RX ORDER — INSULIN ASPART 100 [IU]/ML
INJECTION, SOLUTION INTRAVENOUS; SUBCUTANEOUS
Qty: 75 ML | Refills: 0 | OUTPATIENT
Start: 2025-07-17

## 2025-07-17 RX ORDER — INSULIN ASPART 100 [IU]/ML
INJECTION, SOLUTION INTRAVENOUS; SUBCUTANEOUS
Qty: 100 ML | Refills: 0 | Status: SHIPPED | OUTPATIENT
Start: 2025-07-17

## 2025-07-18 RX ORDER — INSULIN ASPART 100 [IU]/ML
INJECTION, SOLUTION INTRAVENOUS; SUBCUTANEOUS
Qty: 100 ML | Refills: 0 | OUTPATIENT
Start: 2025-07-18

## 2025-07-25 ENCOUNTER — TELEPHONE (OUTPATIENT)
Dept: ENDOCRINOLOGY | Facility: CLINIC | Age: 60
End: 2025-07-25

## 2025-07-25 NOTE — TELEPHONE ENCOUNTER
Patient calling back he has not gotten ozemic he thinks it needs a prior authorization. Please advise.

## 2025-07-25 NOTE — TELEPHONE ENCOUNTER
----- Message from Asif Martines MD sent at 7/16/2025 11:46 PM EDT -----  Hi, please check if her got his ozempic refill. thanks

## 2025-07-28 NOTE — TELEPHONE ENCOUNTER
PA for Ozempic, 2 MG SUBMITTED to [employer] [facility]    via    [x][employer]-Case ID # [id number]  [x]PA sent as URGENT    All office notes, labs and other pertaining documents and studies sent. Clinical questions answered. Awaiting determination from insurance company.     Turnaround time for your insurance to make a decision on your Prior Authorization can take 7-21 business days.

## 2025-07-29 NOTE — TELEPHONE ENCOUNTER
PA for Ozempic, 2 MG  APPROVED     Date(s) approved 7/28/25-7/28/26    Case #[id number]    Patient advised by          [x]MyChart Message  []Phone call   []LMOM  []L/M to call office as no active Communication consent on file  []Unable to leave detailed message as VM not approved on Communication consent       Pharmacy advised by    [x]Fax  []Phone call  []Secure Chat    Specialty Pharmacy    []      Approval letter scanned into Media No